# Patient Record
Sex: FEMALE | URBAN - METROPOLITAN AREA
[De-identification: names, ages, dates, MRNs, and addresses within clinical notes are randomized per-mention and may not be internally consistent; named-entity substitution may affect disease eponyms.]

---

## 2017-03-06 ENCOUNTER — RADIANT APPOINTMENT (OUTPATIENT)
Dept: GENERAL RADIOLOGY | Facility: CLINIC | Age: 67
End: 2017-03-06
Attending: NURSE PRACTITIONER
Payer: COMMERCIAL

## 2017-03-06 ENCOUNTER — OFFICE VISIT (OUTPATIENT)
Dept: FAMILY MEDICINE | Facility: CLINIC | Age: 67
End: 2017-03-06
Payer: COMMERCIAL

## 2017-03-06 VITALS
HEART RATE: 86 BPM | SYSTOLIC BLOOD PRESSURE: 124 MMHG | HEIGHT: 66 IN | DIASTOLIC BLOOD PRESSURE: 80 MMHG | RESPIRATION RATE: 16 BRPM | WEIGHT: 134 LBS | BODY MASS INDEX: 21.53 KG/M2 | TEMPERATURE: 98.4 F | OXYGEN SATURATION: 100 %

## 2017-03-06 DIAGNOSIS — M54.42 ACUTE LEFT-SIDED LOW BACK PAIN WITH LEFT-SIDED SCIATICA: ICD-10-CM

## 2017-03-06 DIAGNOSIS — Z13.6 CARDIOVASCULAR SCREENING; LDL GOAL LESS THAN 130: ICD-10-CM

## 2017-03-06 DIAGNOSIS — M54.2 CERVICALGIA: ICD-10-CM

## 2017-03-06 DIAGNOSIS — Z23 NEED FOR PROPHYLACTIC VACCINATION AGAINST STREPTOCOCCUS PNEUMONIAE (PNEUMOCOCCUS): ICD-10-CM

## 2017-03-06 DIAGNOSIS — M85.80 OSTEOPENIA: ICD-10-CM

## 2017-03-06 DIAGNOSIS — D50.0 IRON DEFICIENCY ANEMIA DUE TO CHRONIC BLOOD LOSS: ICD-10-CM

## 2017-03-06 DIAGNOSIS — Z71.89 ADVANCED DIRECTIVES, COUNSELING/DISCUSSION: ICD-10-CM

## 2017-03-06 DIAGNOSIS — J01.90 ACUTE SINUSITIS WITH SYMPTOMS > 10 DAYS: ICD-10-CM

## 2017-03-06 DIAGNOSIS — R53.83 FATIGUE, UNSPECIFIED TYPE: ICD-10-CM

## 2017-03-06 DIAGNOSIS — M54.2 CERVICALGIA: Primary | ICD-10-CM

## 2017-03-06 DIAGNOSIS — Z11.59 NEED FOR HEPATITIS C SCREENING TEST: ICD-10-CM

## 2017-03-06 PROCEDURE — G0009 ADMIN PNEUMOCOCCAL VACCINE: HCPCS | Performed by: NURSE PRACTITIONER

## 2017-03-06 PROCEDURE — 90670 PCV13 VACCINE IM: CPT | Performed by: NURSE PRACTITIONER

## 2017-03-06 PROCEDURE — 72040 X-RAY EXAM NECK SPINE 2-3 VW: CPT

## 2017-03-06 PROCEDURE — 72100 X-RAY EXAM L-S SPINE 2/3 VWS: CPT

## 2017-03-06 PROCEDURE — 99214 OFFICE O/P EST MOD 30 MIN: CPT | Mod: 25 | Performed by: NURSE PRACTITIONER

## 2017-03-06 RX ORDER — DOXYCYCLINE 100 MG/1
100 TABLET ORAL 2 TIMES DAILY
Qty: 14 TABLET | Refills: 0 | Status: SHIPPED | OUTPATIENT
Start: 2017-03-06 | End: 2017-03-13

## 2017-03-06 RX ORDER — CALCIUM CARBONATE 500(1250)
500 TABLET ORAL 2 TIMES DAILY
COMMUNITY

## 2017-03-06 RX ORDER — UREA 10 %
500 LOTION (ML) TOPICAL DAILY
COMMUNITY

## 2017-03-06 ASSESSMENT — PAIN SCALES - GENERAL: PAINLEVEL: MILD PAIN (2)

## 2017-03-06 NOTE — PROGRESS NOTES
Dear Jessica,    Your recent test results are attached.      Mild arthritic changes present.  Please continue with plan of chiropractic care.    If you have any questions please feel free to contact [phone] or myself via AdYappert.    Sincerely,  Celeste Jacobs, CNP

## 2017-03-06 NOTE — MR AVS SNAPSHOT
After Visit Summary   3/6/2017    Jessica Jordan    MRN: [id number]           Patient Information     Date Of Birth          1950        Visit Information        Provider Department      3/6/2017 10:20 AM Celeste Jacobs APRN [facility]        Today's Diagnoses     Cervicalgia    -  1    Acute left-sided low back pain with left-sided sciatica        Iron deficiency anemia due to chronic blood loss        Fatigue, unspecified type        Osteopenia        Need for prophylactic vaccination against Streptococcus pneumoniae (pneumococcus)        Need for hepatitis C screening test        CARDIOVASCULAR SCREENING; LDL GOAL LESS THAN 130        Advanced directives, counseling/discussion        Acute sinusitis with symptoms > 10 days          Care Instructions    [facility]    If you have any questions regarding to your visit please contact your care team:     Team Pink:   Clinic Hours Telephone Number   Internal Medicine:  Dr. Yodit Jacobs, NP       7am-7pm  Monday - Thursday   7am-5pm  Fridays  [phone]  (Appointment scheduling available 24/7)    Questions about your visit?  Team Line  [phone]   Urgent Care - [place] - 11am-9pm Monday-Friday Saturday-Sunday- 9am-5pm   [place] - 5pm-9pm Monday-Friday Saturday-Sunday- 9am-5pm  [phone] - [place]   [phone] - [place]       What options do I have for visits at the clinic other than the traditional office visit?  To expand how we care for you, many of our providers are utilizing electronic visits (e-visits) and telephone visits, when medically appropriate, for interactions with their patients rather than a visit in the clinic.   We also offer nurse visits for many medical concerns. Just like any other service, we will bill your insurance company for this type of visit based on time spent on the phone with your provider. Not  all insurance companies cover these visits. Please check with your medical insurance if this type of visit is covered. You will be responsible for any charges that are not paid by your insurance.      E-visits via WorkspaceharTeamPages:  generally incur a $35.00 fee.  Telephone visits:  Time spent on the phone: *charged based on time that is spent on the phone in increments of 10 minutes. Estimated cost:   5-10 mins $30.00   11-20 mins. $59.00   21-30 mins. $85.00   Use Acomni (secure email communication and access to your chart) to send your primary care provider a message or make an appointment. Ask someone on your Team how to sign up for Acomni.    For a Price Quote for your services, please call our BrandProject Line at [phone].    As always, Thank you for trusting us with your health care needs!    Discharged By:  GEOVANY PALOMINO          Follow-ups after your visit        Additional Services     HONORING [employer] REFERRAL       Your provider has referred you to Advance Directive Counseling with our [facility] Program.    Reason for Referral: Facilitate General Advance Care Planning    The [facility] Representative will call you to schedule your appointment, unless your appointment was already scheduled at your clinic.  You may also call the [employer] Representative at [phone] to schedule your appointment.            [facility] PT, HAND, AND CHIROPRACTIC REFERRAL       **This order will print in the [facility] Scheduling Office**    Physical Therapy, Hand Therapy and Chiropractic Care are available through:    *[facility]  *[facility]  *[facility]    Call one number to schedule at any of the above locations: [phone].    Your provider has referred you to: Chiropractic at [facility] or [facility]    Indication/Reason for Referral: neck and low back pain  Onset of Illness: 9 months  Therapy Orders: Evaluate and Treat  Special Programs: None  Special Request:  Norman George      Additional Comments for the Therapist or Chiropractor:     Please be aware that coverage of these services is subject to the terms and limitations of your health insurance plan.  Call member services at your health plan with any benefit or coverage questions.      Please bring the following to your appointment:    *Your personal calendar for scheduling future appointments  *Comfortable clothing                  Future tests that were ordered for you today     Open Future Orders        Priority Expected Expires Ordered    Hepatitis C Screen Reflex to HCV RNA Quant and Genotype Routine 3/6/2017 3/6/2018 3/6/2017    CBC with platelets Routine 3/6/2017 3/6/2018 3/6/2017    Ferritin Routine 3/6/2017 3/6/2018 3/6/2017    Iron and iron binding capacity Routine 3/6/2017 3/6/2018 3/6/2017    TSH with free T4 reflex Routine 3/6/2017 3/6/2018 3/6/2017    Comprehensive metabolic panel Routine 3/6/2017 3/6/2018 3/6/2017    Lipid panel reflex to direct LDL Routine 3/6/2017 3/6/2018 3/6/2017    XR Cervical Spine 2/3 Views Routine 3/6/2017 3/6/2018 3/6/2017    XR Lumbar Spine 2/3 Views Routine 3/6/2017 3/6/2018 3/6/2017            Who to contact     If you have questions or need follow up information about today's clinic visit or your schedule please contact [facility] directly at [phone].  Normal or non-critical lab and imaging results will be communicated to you by MyChart, letter or phone within 4 business days after the clinic has received the results. If you do not hear from us within 7 days, please contact the clinic through MetaMedhart or phone. If you have a critical or abnormal lab result, we will notify you by phone as soon as possible.  Submit refill requests through Vineloop or call your pharmacy and they will forward the refill request to us. Please allow 3 business days for your refill to be completed.          Additional Information About Your Visit        MyChart Information  "    [facility] gives you secure access to your electronic health record. If you see a primary care provider, you can also send messages to your care team and make appointments. If you have questions, please call your primary care clinic.  If you do not have a primary care provider, please call [phone] and they will assist you.        Care EveryWhere ID     This is your Care EveryWhere ID. This could be used by other organizations to access your [facility] medical records  [id number]        Your Vitals Were     Pulse Temperature Respirations Height Last Period Pulse Oximetry    86 98.4  F (36.9  C) (Oral) 16 5' 6\" (1.676 m) 10/27/1992 100%    Breastfeeding? BMI (Body Mass Index)                No 21.63 kg/m2           Blood Pressure from Last 3 Encounters:   03/06/17 124/80   10/27/15 119/73   12/10/14 123/83    Weight from Last 3 Encounters:   03/06/17 134 lb (60.8 kg)   10/27/15 144 lb 12.8 oz (65.7 kg)              We Performed the Following     HONORING CHOICES REFERRAL     [facility] PT, HAND, AND CHIROPRACTIC REFERRAL     PNEUMOCOCCAL CONJ VACCINE 13 VALENT IM (PREVNAR 13)          Today's Medication Changes          These changes are accurate as of: 3/6/17 11:15 AM.  If you have any questions, ask your nurse or doctor.               Start taking these medicines.        Dose/Directions    doxycycline Monohydrate 100 MG Tabs   Used for:  Acute sinusitis with symptoms > 10 days   Started by:  Celeste Jacobs APRN CNP        Dose:  100 mg   Take 100 mg by mouth 2 times daily for 7 days   Quantity:  14 tablet   Refills:  0         Stop taking these medicines if you haven't already. Please contact your care team if you have questions.     alendronate 70 MG tablet   Commonly known as:  FOSAMAX   Stopped by:  Celeste Jacobs APRN CNP           vitamin E 1000 UNIT capsule   Commonly known as:  TOCOPHEROL   Stopped by:  Celeste Jacobs APRN CNP                Where to get your medicines    "   These medications were sent to [facility] - [place], [place] - [address]  [address], [place] [address]     Phone:  [phone]     doxycycline Monohydrate 100 MG Tabs                Primary Care Provider Office Phone # Fax #    MAGALIS [place] [phone]       [facility] [address]  [place] [address]        Thank you!     Thank you for choosing [facility]  for your care. Our goal is always to provide you with excellent care. Hearing back from our patients is one way we can continue to improve our services. Please take a few minutes to complete the written survey that you may receive in the mail after your visit with us. Thank you!             Your Updated Medication List - Protect others around you: Learn how to safely use, store and throw away your medicines at www.disposemymeds.org.          This list is accurate as of: 3/6/17 11:15 AM.  Always use your most recent med list.                   Brand Name Dispense Instructions for use    Biotin 7500 MCG Tabs          calcium carbonate 500 MG tablet    OS-FRANKI 500 mg [place]. [place]     Take 500 mg by mouth 2 times daily       cyanocolbalamin 500 MCG tablet    vitamin  B-12     Take 500 mcg by mouth daily       doxycycline Monohydrate 100 MG Tabs     14 tablet    Take 100 mg by mouth 2 times daily for 7 days       MULTIVITAMIN ADULT PO      Take by mouth daily       omega 3 1000 MG Caps      Take 2 g by mouth       sodium-potassium bicarbonate Tbef solu-tab    ISAAK-SELTZER GOLD     Take 1 tablet by mouth once       VITAMIN B6 PO      Take by mouth daily       VITAMIN D3 PO      Take by mouth daily

## 2017-03-06 NOTE — PATIENT INSTRUCTIONS
[facility]    If you have any questions regarding to your visit please contact your care team:     Team Pink:   Clinic Hours Telephone Number   Internal Medicine:  Dr. Yodit Jacobs NP       7am-7pm  Monday - Thursday   7am-5pm  Fridays  [phone]  (Appointment scheduling available 24/7)    Questions about your visit?  Team Line  [phone]   Urgent Care - [place] and [facility] - 11am-9pm Monday-Friday Saturday-Sunday- 9am-5pm   [place] - 5pm-9pm Monday-Friday Saturday-Sunday- 9am-5pm  [phone] - [place]   [phone] - [place]       What options do I have for visits at the clinic other than the traditional office visit?  To expand how we care for you, many of our providers are utilizing electronic visits (e-visits) and telephone visits, when medically appropriate, for interactions with their patients rather than a visit in the clinic.   We also offer nurse visits for many medical concerns. Just like any other service, we will bill your insurance company for this type of visit based on time spent on the phone with your provider. Not all insurance companies cover these visits. Please check with your medical insurance if this type of visit is covered. You will be responsible for any charges that are not paid by your insurance.      E-visits via CureVac:  generally incur a $35.00 fee.  Telephone visits:  Time spent on the phone: *charged based on time that is spent on the phone in increments of 10 minutes. Estimated cost:   5-10 mins $30.00   11-20 mins. $59.00   21-30 mins. $85.00   Use [employer] Technologiest (secure email communication and access to your chart) to send your primary care provider a message or make an appointment. Ask someone on your Team how to sign up for CureVac.    For a Price Quote for your services, please call our Consumer Price Line at [phone].    As always, Thank you for trusting us with your health care  needs!    Discharged By:  GEOVANY PALOMINO

## 2017-03-06 NOTE — NURSING NOTE
"Chief Complaint   Patient presents with     Fatigue     Musculoskeletal Problem     left side       Initial /80  Pulse 86  Temp 98.4  F (36.9  C) (Oral)  Resp 16  Ht 5' 6\" (1.676 m)  Wt 134 lb (60.8 kg)  LMP 10/27/1992  SpO2 100%  Breastfeeding? No  BMI 21.63 kg/m2 Estimated body mass index is 21.63 kg/(m^2) as calculated from the following:    Height as of this encounter: 5' 6\" (1.676 m).    Weight as of this encounter: 134 lb (60.8 kg).  Medication Reconciliation: complete   Nuria Martinez CMA      "

## 2017-03-06 NOTE — PROGRESS NOTES
Dear Jessica,    Your recent test results are attached.      Mild arthritic changes present.  Please continue with plan for chiropractic care.    If you have any questions please feel free to contact [phone] or myself via Trumakert.    Sincerely,  Celeste Jacobs, CNP

## 2017-03-06 NOTE — PROGRESS NOTES
SUBJECTIVE:                                                    Jessica Jordan is a 67 year old female who presents to clinic today for the following health issues:    Musculoskeletal problem/pain left side body pain      Duration: 9 months    Description  Location: left side from shoulder to foot    Intensity:  moderate    Accompanying signs and symptoms: achy mostly from hip to knee    History  Previous similar problem: YES- for last 7 years  Previous evaluation:  none    Precipitating or alleviating factors:  Trauma or overuse: YES- work  Aggravating factors include: sitting and laying in bed    Therapies tried and outcome: heat and NSAID - aleve and advil       Fatigue for the past year. 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     Patient complains of intermittent pain to left neck and left shoulder.  Pain also occurs to left thigh, low back, and occasionally down left leg to left ankle.  Patient had a back injury in 1979, she is unsure what happened.  Patient takes advil pm, aleve with some improvement.  She has also tried chiropractic care.  Patient denies saddle anesthesia, fever, bowel/bladder dysfunction, leg weakness.    Patient complains of nasal congestion, chest congestion for the past 3 weeks.  Patient denies fever, productive cough, chest pain, shortness of breath.  Patient complains of sneezing, mild sore throat.  Patient has been doing nasal rinses, daphne selzer cold and sinus.    Problem list and histories reviewed & adjusted, as indicated.  Additional history: as documented    Patient Active Problem List   Diagnosis     Major depression in complete remission (H)     Vitamin D deficiency     Family history of ovarian cancer     Iron deficiency anemia due to chronic blood loss     Osteopenia     Past Surgical History   Procedure Laterality Date     Appendectomy       Breast surgery       Bilateral breast implants     Hysterectomy, pap no longer indicated         Social History  "  Substance Use Topics     Smoking status: Never Smoker     Smokeless tobacco: Not on file     Alcohol use Yes      Comment: once a week     History reviewed. No pertinent family history.      Current Outpatient Prescriptions   Medication Sig Dispense Refill     cyanocolbalamin (VITAMIN  B-12) 500 MCG tablet Take 500 mcg by mouth daily       Multiple Vitamins-Minerals (MULTIVITAMIN ADULT PO) Take by mouth daily       calcium carbonate (OS-FRANKI 500 MG [place]. [place]) 500 MG tablet Take 500 mg by mouth 2 times daily       Pyridoxine HCl (VITAMIN B6 PO) Take by mouth daily       doxycycline Monohydrate 100 MG TABS Take 100 mg by mouth 2 times daily for 7 days 14 tablet 0     Biotin 7500 MCG TABS        omega 3 1000 MG CAPS Take 2 g by mouth       Cholecalciferol (VITAMIN D3 PO) Take by mouth daily       sodium-potassium bicarbonate (ISAAK-SELTZER GOLD) TBEF Take 1 tablet by mouth once       Allergies   Allergen Reactions     Codeine      Penicillins      BP Readings from Last 3 Encounters:   03/06/17 124/80   10/27/15 119/73   12/10/14 123/83    Wt Readings from Last 3 Encounters:   03/06/17 134 lb (60.8 kg)   10/27/15 144 lb 12.8 oz (65.7 kg)                  Labs reviewed in EPIC    Reviewed and updated as needed this visit by clinical staff       Reviewed and updated as needed this visit by Provider         ROS:  Constitutional, HEENT, cardiovascular, pulmonary, gi and gu systems are negative, except as otherwise noted.    OBJECTIVE:                                                    /80  Pulse 86  Temp 98.4  F (36.9  C) (Oral)  Resp 16  Ht 5' 6\" (1.676 m)  Wt 134 lb (60.8 kg)  LMP 10/27/1992  SpO2 100%  Breastfeeding? No  BMI 21.63 kg/m2  Body mass index is 21.63 kg/(m^2).  GENERAL: healthy, alert and no distress  EYES: Eyes grossly normal to inspection, PERRL and conjunctivae and sclerae normal  HENT: normal cephalic/atraumatic, ear canals and TM's normal, nose and mouth without ulcers or lesions, nasal mucosa " edematous , oropharynx clear and oral mucous membranes moist  NECK: no adenopathy, no asymmetry, masses, or scars and thyroid normal to palpation  RESP: lungs clear to auscultation - no rales, rhonchi or wheezes  CV: regular rate and rhythm, normal S1 S2, no S3 or S4, no murmur, click or rub, no peripheral edema and peripheral pulses strong  MS: Neck: Tenderness to left trapezius; full range of motion without pain; 5/5 strength and sensation intact to bilateral upper extremities; Spurling's test negative  Comprehensive back pain exam:  Tenderness of left sciatic notch, Range of motion not limited by pain, Lower extremity strength functional and equal on both sides, Lower extremity reflexes within normal limits bilaterally, Lower extremity sensation normal and equal on both sides and Straight leg raise negative bilaterally    Diagnostic Test Results:  pending     ASSESSMENT/PLAN:                                                      1. Cervicalgia    - XR Cervical Spine 2/3 Views; Future  - [facility] PT, HAND, AND CHIROPRACTIC REFERRAL    2. Acute left-sided low back pain with left-sided sciatica    - XR Lumbar Spine 2/3 Views; Future  - [facility] PT, HAND, AND CHIROPRACTIC REFERRAL    3. Iron deficiency anemia due to chronic blood loss    - CBC with platelets; Future  - Ferritin; Future  - Iron and iron binding capacity; Future    4. Fatigue, unspecified type    - CBC with platelets; Future  - TSH with free T4 reflex; Future  - Comprehensive metabolic panel; Future    5. Osteopenia  Patient declines bisphosphonate's at this time and prefers to work on exercise and recheck DEXA scan again in 2 years.    6. Need for prophylactic vaccination against Streptococcus pneumoniae (pneumococcus)    - PNEUMOCOCCAL CONJ VACCINE 13 VALENT IM (PREVNAR 13)    7. Need for hepatitis C screening test    - Hepatitis C Screen Reflex to HCV RNA Quant and Genotype; Future    8. CARDIOVASCULAR SCREENING; LDL GOAL LESS THAN 130    - Lipid panel reflex  to direct LDL; Future    9. Advanced directives, counseling/discussion    - HONORING CHOICES REFERRAL    10. Acute sinusitis with symptoms > 10 days    - doxycycline Monohydrate 100 MG TABS; Take 100 mg by mouth 2 times daily for 7 days  Dispense: 14 tablet; Refill: 0    FUTURE APPOINTMENTS:       - Follow-up for annual visit or as needed    MAGALIS Mcneil CNP  [facility]'

## 2017-03-08 DIAGNOSIS — Z11.59 NEED FOR HEPATITIS C SCREENING TEST: ICD-10-CM

## 2017-03-08 DIAGNOSIS — Z13.6 CARDIOVASCULAR SCREENING; LDL GOAL LESS THAN 130: ICD-10-CM

## 2017-03-08 DIAGNOSIS — D50.0 IRON DEFICIENCY ANEMIA DUE TO CHRONIC BLOOD LOSS: ICD-10-CM

## 2017-03-08 DIAGNOSIS — R53.83 FATIGUE, UNSPECIFIED TYPE: ICD-10-CM

## 2017-03-08 LAB
ALBUMIN SERPL-MCNC: 3.8 G/DL (ref 3.4–5)
ALP SERPL-CCNC: 80 U/L (ref 40–150)
ALT SERPL W P-5'-P-CCNC: 23 U/L (ref 0–50)
ANION GAP SERPL CALCULATED.3IONS-SCNC: 4 MMOL/L (ref 3–14)
AST SERPL W P-5'-P-CCNC: 19 U/L (ref 0–45)
BILIRUB SERPL-MCNC: 0.5 MG/DL (ref 0.2–1.3)
BUN SERPL-MCNC: 16 MG/DL (ref 7–30)
CALCIUM SERPL-MCNC: 9.4 MG/DL (ref 8.5–10.1)
CHLORIDE SERPL-SCNC: 109 MMOL/L (ref 94–109)
CHOLEST SERPL-MCNC: 229 MG/DL
CO2 SERPL-SCNC: 29 MMOL/L (ref 20–32)
CREAT SERPL-MCNC: 0.81 MG/DL (ref 0.52–1.04)
ERYTHROCYTE [DISTWIDTH] IN BLOOD BY AUTOMATED COUNT: 13.1 % (ref 10–15)
FERRITIN SERPL-MCNC: 42 NG/ML (ref 8–252)
GFR SERPL CREATININE-BSD FRML MDRD: 71 ML/MIN/1.7M2
GLUCOSE SERPL-MCNC: 103 MG/DL (ref 70–99)
HCT VFR BLD AUTO: 40.4 % (ref 35–47)
HCV AB SERPL QL IA: NORMAL
HDLC SERPL-MCNC: 107 MG/DL
HGB BLD-MCNC: 13.3 G/DL (ref 11.7–15.7)
IRON SATN MFR SERPL: 38 % (ref 15–46)
IRON SERPL-MCNC: 135 UG/DL (ref 35–180)
LDLC SERPL CALC-MCNC: 109 MG/DL
MCH RBC QN AUTO: 31.4 PG (ref 26.5–33)
MCHC RBC AUTO-ENTMCNC: 32.9 G/DL (ref 31.5–36.5)
MCV RBC AUTO: 95 FL (ref 78–100)
NONHDLC SERPL-MCNC: 122 MG/DL
PLATELET # BLD AUTO: 284 10E9/L (ref 150–450)
POTASSIUM SERPL-SCNC: 3.9 MMOL/L (ref 3.4–5.3)
PROT SERPL-MCNC: 7 G/DL (ref 6.8–8.8)
RBC # BLD AUTO: 4.24 10E12/L (ref 3.8–5.2)
SODIUM SERPL-SCNC: 142 MMOL/L (ref 133–144)
TIBC SERPL-MCNC: 355 UG/DL (ref 240–430)
TRIGL SERPL-MCNC: 67 MG/DL
TSH SERPL DL<=0.005 MIU/L-ACNC: 3.06 MU/L (ref 0.4–4)
WBC # BLD AUTO: 4.9 10E9/L (ref 4–11)

## 2017-03-08 PROCEDURE — 36415 COLL VENOUS BLD VENIPUNCTURE: CPT | Performed by: NURSE PRACTITIONER

## 2017-03-08 PROCEDURE — 82728 ASSAY OF FERRITIN: CPT | Performed by: NURSE PRACTITIONER

## 2017-03-08 PROCEDURE — 84443 ASSAY THYROID STIM HORMONE: CPT | Performed by: NURSE PRACTITIONER

## 2017-03-08 PROCEDURE — 83540 ASSAY OF IRON: CPT | Performed by: NURSE PRACTITIONER

## 2017-03-08 PROCEDURE — 80061 LIPID PANEL: CPT | Performed by: NURSE PRACTITIONER

## 2017-03-08 PROCEDURE — 80053 COMPREHEN METABOLIC PANEL: CPT | Performed by: NURSE PRACTITIONER

## 2017-03-08 PROCEDURE — 86803 HEPATITIS C AB TEST: CPT | Performed by: NURSE PRACTITIONER

## 2017-03-08 PROCEDURE — 83550 IRON BINDING TEST: CPT | Performed by: NURSE PRACTITIONER

## 2017-03-08 PROCEDURE — 85027 COMPLETE CBC AUTOMATED: CPT | Performed by: NURSE PRACTITIONER

## 2017-03-08 NOTE — PROGRESS NOTES
"Dear Jessica,    Your recent test results are attached.      Normal iron and iron stores.  Normal thyroid.    Your fasting glucose is elevated and puts you in the range of what's considered impaired fasting glucose or \"pre-diabetes\".  This increases your risk of developing Type 2 Diabetes in the future.  Normal glucose is considered less than 100 mg/dL and impaired fasting glucose ranges from 100-125 mg/dL.  Diet and exercise can help improve this number.  I would recommend trying to eat a well balanced diet with fruits, vegetables, whole grains, low fat dairy, and lean protein.  I would also recommend try to increase your physical activity to the point that you are exercising 30-60 minutes per day 5 or more days per week. If you are interested in taking a pre-diabetes class, please contact clinic at [phone].    Good cholesterol.          If you have any questions please feel free to contact [phone] or myself via The NewsMarkett.    Sincerely,  Celeste Jacobs, CNP  "

## 2017-03-08 NOTE — PROGRESS NOTES
Dear Jessica,    Your recent test results are attached.      No anemia.    If you have any questions please feel free to contact [phone] or myself via iovoxt.    Sincerely,  Celeste Jacobs, CNP

## 2017-03-08 NOTE — PROGRESS NOTES
Dear Jessica,    Your recent test results are attached.      No hepatitis C on screening.    If you have any questions please feel free to contact [phone] or myself via [employer] Techn[employer]iest.    Sincerely,  Celeste Jacobs, CNP

## 2017-03-15 ENCOUNTER — THERAPY VISIT (OUTPATIENT)
Dept: CHIROPRACTIC MEDICINE | Facility: CLINIC | Age: 67
End: 2017-03-15
Payer: COMMERCIAL

## 2017-03-15 DIAGNOSIS — M99.02 THORACIC SEGMENT DYSFUNCTION: ICD-10-CM

## 2017-03-15 DIAGNOSIS — M99.01 CERVICAL SEGMENT DYSFUNCTION: ICD-10-CM

## 2017-03-15 DIAGNOSIS — M54.2 CERVICALGIA: ICD-10-CM

## 2017-03-15 DIAGNOSIS — M99.05 SEGMENTAL DYSFUNCTION OF PELVIC REGION: Primary | ICD-10-CM

## 2017-03-15 DIAGNOSIS — M54.50 LUMBAGO: ICD-10-CM

## 2017-03-15 DIAGNOSIS — M99.03 SEGMENTAL DYSFUNCTION OF LUMBAR REGION: ICD-10-CM

## 2017-03-15 DIAGNOSIS — M62.838 SPASM OF MUSCLE: ICD-10-CM

## 2017-03-15 PROCEDURE — 98941 CHIROPRACT MANJ 3-4 REGIONS: CPT | Mod: AT | Performed by: CHIROPRACTOR

## 2017-03-15 PROCEDURE — 99203 OFFICE O/P NEW LOW 30 MIN: CPT | Mod: GA | Performed by: CHIROPRACTOR

## 2017-03-15 NOTE — PROGRESS NOTES
Chiropractic Clinic Visit    PCP: Celeste Jacobs    Jessica Jordan is a 67 year old female who is seen  in consultation at the request of  Celeste Jacobs C.N.P. presenting with left side pain from ankle to neck, with primary pain from lateral thigh to lumbar. Patient reports that the onset was 5 years ago with an increase in pain in the last 6 months and another increase in the last month. When asked, patient denies:, falling, slipping, bending and reaching or sleeping awkwardly. Jessica reports initially her left leg would give out when bowling which she discontinued, now provacative factors include sleeping and sitting with legs extended in a recliner.  Prior to onset, the patient was able to sleep through the night. Patient notes that due to symptoms, they can only wake several times a night and readjust to fall asleep. Jessica Jordan notes   sleeping rated at a 9/10 difficulty and prior to this incident it was 0/10.        Injury: none    Location of Pain: left ankle to neck at the following level(s) C4 , C5 , C6 , T12 , L1, L4  and L5   Duration of Pain: 5 year(s)  Rating of Pain at worst: 9/10  Rating of Pain Currently: 4/10  Symptoms are better with: Aleve, Ibuprofen and Rest  Symptoms are worse with: sitting and sleeping  Additional Features:      Other evaluation and/or treatments so far consists of: Aleve and Ibuprofen    Health History  as reported by the patient:    How does the patient rate their own health:   Good    Current or past medical history:   Depression and Pain at night/rest    Medical allergies  Other: Penicillin    Past Traumas/Surgeries  None    Family History  The family history is not on file.    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    Past Medical History   Diagnosis Date     Depressive disorder      Past Surgical History   Procedure Laterality Date     Appendectomy       Breast surgery       Bilateral breast implants     Hysterectomy, pap no longer indicated         Objective  LMP 10/27/1992    GENERAL APPEARANCE: healthy, alert and no distress   GAIT: NORMAL  SKIN: no suspicious lesions or rashes  NEURO: Normal strength and tone, mentation intact and speech normal  PSYCH:  mentation appears normal and affect normal/bright      Jessica was asked to complete the Neck Disability Index, the Oswestry Low Back Disability Index and Doris Start Back screening tool, today in the office. Patient declined to complete the NDI form., The Oswestry Disability score: 17.14%. Keel Start Total Score:3 Sub Score: 1       Cervical Spine Exam    Range of Motion:         forward flexion mildly limited no pain         extension mildly limited no pain         lateral rotation moderately limited no pain        lateral flexion moderately limited no pain    Inspection:         No visible deformity        normal lordotic curvature maintained    Tender:        upper border of trapezius    Non-Tender:        remainder of cervical spine area    Strength:       C4 (shoulder shrug)  symmetric 5/5       C5 (shoulder abduction) symmetric 5/5       C6 (elbow flexion) symmetric 5/5       C7 (elbow extension) symmetric 5/5       C8 (finger abduction, thumb flexion) symmetric 5/5    Reflexes:        C5 (biceps) symmetric normal    Sensation:       grossly intact througout bilateral upper extremities    Special Tests:       neg (-) Spurling  Rik's- negative and Shoulder depression - Right negative and Left negative    Lymphatics:        no edema noted in the upper extremities       Lumbar exam:    Inspection:  \"     no visible deformity in the low back       normal skin\",    ROM:    "    limited flexion due to pain       limited extension due to pain       asymmetric rotation of the pelvis with flexion    Tender:       paraspinal muscles    Non Tender:       remainder of lumbar spine    Strength:       hip flexion 5/5       knee extension 5/5       ankle dorsiflexion 5/5       ankle plantarflexion 5/5       dorsiflexion of the great toe 5/5    Reflexes:       patellar (L3, L4) symmetric normal    Sensation:      grossly intact throughout lower extremities    Special tests:  SLR - Right negative and Left negative, Fabere - Right negative and Left negative, Yeoman's - Right negative and Left negative, Tyrell - Right negative and Left negative and Ely's - Right negative and Left negative    Segmental spinal dysfunction/restrictions found at:  :  C1 Left rotation restricted  C6 Right rotation restricted  C7 Right rotation restricted  T1 Right rotation restricted  T2 Right rotation restricted  T12 Extension restriction  L1 Left rotation restricted  L2 Left rotation restricted  L5 Right rotation restricted  PSIS Right Extension restriction.    The following soft tissue hypotonicities were observed:Piriformis: left, referred pain: no  Traps: ache, dull pain and stiff, referred pain: no    Trigger points were found in:Piriformis and Traps    Muscle spasm found in:Piriformis and Traps      Radiology:  XR cervical 3.6.37  IMPRESSION: Mild low cervical degenerative change, principally  characterized by loss of disc space.     Lumbar XR 3.6.17  IMPRESSION: Mild degenerative change.    Assessment:    1. Segmental dysfunction of pelvic region    2. Lumbago    3. Segmental dysfunction of lumbar region    4. Spasm of muscle    5. Thoracic segment dysfunction    6. Cervicalgia    7. Cervical segment dysfunction        RX ordered/plan of care  Anticipated outcomes  Possible risks and side effects    After discussing the risk and benefits of care, patient consented to treatment    Prognosis: Good      Patient's  condition:  Patient had restrictions pre-manipulation    Treatment effectiveness:  Post manipulation there is better intersegmental movement and Patient claims to feel looser post manipulation      Plan:    Procedures:  Evaluation and Management  93405 Moderate level exam 30 min    CMT:  32210 Chiropractic manipulative treatment 3-4 regions performed   Cervical: Activator, C1 , C6, C7 , Prone  Thoracic: Activator, T1, T2, T11, T12, Prone  Lumbar: Activator, L1, L2, L5, Prone  Pelvis: Drop Table, PSIS Right , Prone    Modalities:  01122: Heat:   For 5 min to Piriformis and Traps  34192: MSTM:  To Piriformis and Traps  for 5 min    Therapeutic procedures:  None    Response to Treatment  Reduction in symptoms as reported by patient    Treatment plan and goals:  Goals:  YVETTE: To change YVETTE score from 17 to 0    Frequency of care  Duration of care is estimated to be 6 weeks, from the initial treatment.  It is estimated that the patient will need a total of 6 visits to resolve this episode.  For the initial therapeutic trial of care, the frequency is recommended at 1 X week, once daily.  A reevaluation would be clinically appropriate in 6 visits, to determine progress and further course of care.    In-Office Treatment  Evaluation  Spinal Chiropractic Manipulative Therapy:    Modalities:  Heat and MSTM              Recommendations:    Instructions:ice 20 minutes every other hour as needed    Follow-up:  Return to care in one week.       Disclaimer: This note consists of symbols derived from keyboarding, dictation and/or voice recognition software. As a result, there may be errors in the script that have gone undetected. Please consider this when interpreting information found in this chart.

## 2017-03-22 ENCOUNTER — THERAPY VISIT (OUTPATIENT)
Dept: CHIROPRACTIC MEDICINE | Facility: CLINIC | Age: 67
End: 2017-03-22
Payer: COMMERCIAL

## 2017-03-22 DIAGNOSIS — M99.05 SEGMENTAL DYSFUNCTION OF PELVIC REGION: Primary | ICD-10-CM

## 2017-03-22 DIAGNOSIS — M99.01 CERVICAL SEGMENT DYSFUNCTION: ICD-10-CM

## 2017-03-22 DIAGNOSIS — M62.838 SPASM OF MUSCLE: ICD-10-CM

## 2017-03-22 DIAGNOSIS — M54.50 LUMBAGO: ICD-10-CM

## 2017-03-22 DIAGNOSIS — M54.2 CERVICALGIA: ICD-10-CM

## 2017-03-22 DIAGNOSIS — M99.03 SEGMENTAL DYSFUNCTION OF LUMBAR REGION: ICD-10-CM

## 2017-03-22 DIAGNOSIS — M99.02 SEGMENTAL DYSFUNCTION OF THORACIC REGION: ICD-10-CM

## 2017-03-22 PROCEDURE — 98941 CHIROPRACT MANJ 3-4 REGIONS: CPT | Mod: AT | Performed by: CHIROPRACTOR

## 2017-03-22 NOTE — PROGRESS NOTES
Visit #:  2 of 6, based on treatment plan    Subjective:  Jessica Jordan is a 67 year old female who is seen in f/u up for:        Segmental dysfunction of pelvic region  Lumbago  Segmental dysfunction of lumbar region  Spasm of muscle  Segmental dysfunction of thoracic region  Cervicalgia  Cervical segment dysfunction.     Since last visit on 3/15/2017,  Jessica Jordan reports the following changes: Pain immediately after last treatment: 2/10 and their pain level today 6/10.  Jessica reports that her low back and hips felt good until last night when she had a sharp stab in her right hip when brushing her teeth.  She notes a significant decrease in pain in her left hip.  Jessica reports her neck felt better for about a day when the pain returned to previous levels.    Area of chief complaint:  Cervical and Lumbar :  Symptoms are graded at 6/10. The quality is described as achey.  Motion has increased, but is still not normal. Patient feels that they are improved due to a reduction in symptoms.        Objective:  The following was observed:    P: palpatory tenderness, R piriformis and upper traps    A: static palpation demonstrates intersegmental asymmetry , pelvis, lumbar, thoracic and cervical spine     R: motion palpation notes restricted motion, :  C1 Left rotation restricted  C6 Right rotation restricted  C7 Right rotation restricted  T1 Right rotation restricted  T2 Right rotation restricted  T6 Extension restriction  T7 Extension restriction  T8 Extension restriction  L4 Right rotation restricted  L5 Right rotation restricted  PSIS Right Extension restriction    T: hypertonicity at: Piriformis and Traps R>>L      Assessment:    Segmental spinal dysfunction/restrictions found at:  C1   C6   C7   T1   T2   T6  T7  T8  L4  L5  PSIS Right    Diagnoses:      1. Segmental dysfunction of pelvic region    2. Lumbago    3. Segmental dysfunction of lumbar region    4. Spasm of muscle    5. Segmental dysfunction of  thoracic region    6. Cervicalgia    7. Cervical segment dysfunction        Patient's condition:  Patient had restrictions pre-manipulation    Treatment effectiveness:  Post manipulation there is better intersegmental movement and Patient claims to feel looser post manipulation      Procedures:  CMT:  85922 Chiropractic manipulative treatment 3-4 regions performed   Cervical: Activator, C1 , C6, C7 , Prone  Thoracic: Activator, T1, T2, T6, T7, T8, Prone  Lumbar: Activator, L4, L5, Prone  Pelvis: Drop Table, PSIS Right , Prone    Modalities:  None performed this visit    Therapeutic procedures:  Stretches - demonstrated and practiced seated crossed leg piriformis stretch      Prognosis: Good    Progress towards Goals: Patient is making progress towards the goal     Response to Treatment:   Reduction in symptoms as reported by patient      Recommendations:    Instructions:ice 20 minutes every other hour as needed and stretch as instructed at visit    Follow-up:  Return to care in one week

## 2017-03-30 ENCOUNTER — THERAPY VISIT (OUTPATIENT)
Dept: CHIROPRACTIC MEDICINE | Facility: CLINIC | Age: 67
End: 2017-03-30
Payer: COMMERCIAL

## 2017-03-30 DIAGNOSIS — M99.03 SEGMENTAL DYSFUNCTION OF LUMBAR REGION: ICD-10-CM

## 2017-03-30 DIAGNOSIS — M62.838 SPASM OF MUSCLE: ICD-10-CM

## 2017-03-30 DIAGNOSIS — M99.05 SEGMENTAL DYSFUNCTION OF PELVIC REGION: Primary | ICD-10-CM

## 2017-03-30 DIAGNOSIS — M99.02 THORACIC SEGMENT DYSFUNCTION: ICD-10-CM

## 2017-03-30 DIAGNOSIS — M54.2 CERVICALGIA: ICD-10-CM

## 2017-03-30 DIAGNOSIS — M99.01 CERVICAL SEGMENT DYSFUNCTION: ICD-10-CM

## 2017-03-30 DIAGNOSIS — M54.50 LUMBAGO: ICD-10-CM

## 2017-03-30 PROCEDURE — 98941 CHIROPRACT MANJ 3-4 REGIONS: CPT | Mod: AT | Performed by: CHIROPRACTOR

## 2017-03-30 NOTE — PROGRESS NOTES
Visit #:  3 of 6, based on treatment plan    Subjective:  Jessica Jordan is a 67 year old female who is seen in f/u up for:        Segmental dysfunction of pelvic region  Lumbago  Segmental dysfunction of lumbar region  Spasm of muscle  Thoracic segment dysfunction  Cervicalgia  Cervical segment dysfunction.     Since last visit on 3/22/2017,  Jessica Jordan reports the following changes: Pain immediately after last treatment: 2/10 and their pain level today 5/10.  Jessica reports that her low back and hips are feeling better still a bit stiff at times.  Her neck is very tight and sore this past week    Area of chief complaint:  Cervical and Lumbar :  Symptoms are graded at 6/10. The quality is described as achey.  Motion has increased, but is still not normal. Patient feels that they are improved due to a reduction in symptoms.        Objective:  The following was observed:    P: palpatory tenderness, R piriformis and upper traps    A: static palpation demonstrates intersegmental asymmetry , pelvis, lumbar, thoracic and cervical spine     R: motion palpation notes restricted motion, :  C1 Left rotation restricted  C6 Right rotation restricted  C7 Right rotation restricted  T1 Right rotation restricted  T2 Right rotation restricted  T6 Extension restriction  T7 Extension restriction  T8 Extension restriction  L4 Right rotation restricted  L5 Right rotation restricted  PSIS Right Extension restriction    T: hypertonicity at: Piriformis and Traps R>>L      Assessment:    Segmental spinal dysfunction/restrictions found at:  C1   C6   C7   T1   T2   T6  T7  T8  L4  L5  PSIS Right    Diagnoses:      1. Segmental dysfunction of pelvic region    2. Lumbago    3. Segmental dysfunction of lumbar region    4. Spasm of muscle    5. Thoracic segment dysfunction    6. Cervicalgia    7. Cervical segment dysfunction        Patient's condition:  Patient had restrictions pre-manipulation    Treatment effectiveness:  Post  manipulation there is better intersegmental movement and Patient claims to feel looser post manipulation      Procedures:  CMT:  32320 Chiropractic manipulative treatment 3-4 regions performed   Cervical: Activator, C1 , C6, C7 , Prone  Thoracic: Activator, T1, T2, T6, T7, T8, Prone  Lumbar: Activator, L4, L5, Prone  Pelvis: Drop Table, PSIS Right , Prone    Modalities:  None performed this visit    Therapeutic procedures:  Stretches - demonstrated and practiced seated  Scapular retraction    Prognosis: Good    Progress towards Goals: Patient is making progress towards the goal     Response to Treatment:   Reduction in symptoms as reported by patient      Recommendations:    Instructions:ice 20 minutes every other hour as needed and stretch as instructed at visit    Follow-up:  Return to care in one week

## 2017-04-06 ENCOUNTER — THERAPY VISIT (OUTPATIENT)
Dept: CHIROPRACTIC MEDICINE | Facility: CLINIC | Age: 67
End: 2017-04-06
Payer: COMMERCIAL

## 2017-04-06 DIAGNOSIS — M62.838 SPASM OF MUSCLE: ICD-10-CM

## 2017-04-06 DIAGNOSIS — M99.02 SEGMENTAL DYSFUNCTION OF THORACIC REGION: ICD-10-CM

## 2017-04-06 DIAGNOSIS — M54.50 LUMBAGO: ICD-10-CM

## 2017-04-06 DIAGNOSIS — M99.01 CERVICAL SEGMENT DYSFUNCTION: ICD-10-CM

## 2017-04-06 DIAGNOSIS — M54.2 CERVICALGIA: ICD-10-CM

## 2017-04-06 DIAGNOSIS — M99.05 SEGMENTAL DYSFUNCTION OF PELVIC REGION: Primary | ICD-10-CM

## 2017-04-06 DIAGNOSIS — M99.03 SEGMENTAL DYSFUNCTION OF LUMBAR REGION: ICD-10-CM

## 2017-04-06 PROCEDURE — 98941 CHIROPRACT MANJ 3-4 REGIONS: CPT | Mod: AT | Performed by: CHIROPRACTOR

## 2017-04-06 NOTE — PROGRESS NOTES
Visit #:  4 of 6, based on treatment plan    Subjective:  Jessica Jordan is a 67 year old female who is seen in f/u up for:        Segmental dysfunction of pelvic region  Lumbago  Segmental dysfunction of lumbar region  Spasm of muscle  Thoracic segment dysfunction  Cervicalgia  Cervical segment dysfunction.     Since last visit on 3/30/2017,  Jessica Jordan reports the following changes: Pain immediately after last treatment: 2/10 and their pain level today 4/10.  Jessica reports that her low back and hips are feeling better still a bit stiff at times.  Her neck is  Feeling less tense overall.  She went for a 2 mile walk this past week and is a little stiff from that.  This was the first time she has walked in a while.    Area of chief complaint:  Cervical and Lumbar :  Symptoms are graded at 4/10. The quality is described as achey.  Motion has increased, but is still not normal. Patient feels that they are improved due to a reduction in symptoms.        Objective:  The following was observed:    P: palpatory tenderness, R piriformis and upper traps    A: static palpation demonstrates intersegmental asymmetry , pelvis, lumbar, thoracic and cervical spine     R: motion palpation notes restricted motion, :  C6 Right rotation restricted  C7 Right rotation restricted  T1 Right rotation restricted  T2 Right rotation restricted  T6 Extension restriction  T7 Extension restriction  T8 Extension restriction  L4 Right rotation restricted  L5 Right rotation restricted  PSIS Right Extension restriction    T: hypertonicity at: Piriformis and Traps R>>L      Assessment:    Segmental spinal dysfunction/restrictions found at:  C6   C7   T1   T2   T6  T7  T8  L4  L5  PSIS Right    Diagnoses:      1. Segmental dysfunction of pelvic region    2. Lumbago    3. Segmental dysfunction of lumbar region    4. Spasm of muscle    5. Thoracic segment dysfunction    6. Cervicalgia    7. Cervical segment dysfunction        Patient's  condition:  Patient had restrictions pre-manipulation    Treatment effectiveness:  Post manipulation there is better intersegmental movement and Patient claims to feel looser post manipulation      Procedures:  CMT:  16425 Chiropractic manipulative treatment 3-4 regions performed   Cervical: Activator, C6, C7 , Prone  Thoracic: Activator, T1, T2, T6, T7, T8, Prone  Lumbar: Activator, L4, L5, Prone  Pelvis: Drop Table, PSIS Right , Prone    Modalities:  None performed this visit    Therapeutic procedures:  Stretches - demonstrated and practiced seated  Scapular retraction    Prognosis: Good    Progress towards Goals: Patient is making progress towards the goal     Response to Treatment:   Reduction in symptoms as reported by patient      Recommendations:    Instructions:ice 20 minutes every other hour as needed and stretch as instructed at visit    Follow-up:  Return to care in one week

## 2017-04-13 ENCOUNTER — THERAPY VISIT (OUTPATIENT)
Dept: CHIROPRACTIC MEDICINE | Facility: CLINIC | Age: 67
End: 2017-04-13
Payer: COMMERCIAL

## 2017-04-13 DIAGNOSIS — M54.2 CERVICALGIA: ICD-10-CM

## 2017-04-13 DIAGNOSIS — M54.50 LUMBAGO: ICD-10-CM

## 2017-04-13 DIAGNOSIS — M62.838 SPASM OF MUSCLE: ICD-10-CM

## 2017-04-13 DIAGNOSIS — M99.05 SEGMENTAL DYSFUNCTION OF PELVIC REGION: Primary | ICD-10-CM

## 2017-04-13 DIAGNOSIS — M99.02 SEGMENTAL DYSFUNCTION OF THORACIC REGION: ICD-10-CM

## 2017-04-13 DIAGNOSIS — M99.03 SEGMENTAL DYSFUNCTION OF LUMBAR REGION: ICD-10-CM

## 2017-04-13 DIAGNOSIS — M99.01 CERVICAL SEGMENT DYSFUNCTION: ICD-10-CM

## 2017-04-13 PROCEDURE — 98941 CHIROPRACT MANJ 3-4 REGIONS: CPT | Mod: AT | Performed by: CHIROPRACTOR

## 2017-04-13 NOTE — PROGRESS NOTES
Visit #:  5 of 6, based on treatment plan    Subjective:  Jessica Jordan is a 67 year old female who is seen in f/u up for:        Segmental dysfunction of pelvic region  Lumbago  Segmental dysfunction of lumbar region  Spasm of muscle  Thoracic segment dysfunction  Cervicalgia  Cervical segment dysfunction.     Since last visit on 4/6/2017,  Jessica Jordan reports the following changes: Pain immediately after last treatment: 2/10 and their pain level today 0/10.  Jessica reports that her low back and hips are feeling better.  She is not having any pain unless she is stretching and the she feels some pain on the right where it is tighter. Overall she is feeling improved.    Area of chief complaint:  Cervical and Lumbar :  Symptoms are graded at 0/10. The quality is described as achey.  Motion has increased, but is still not normal. Patient feels that they are improved due to a reduction in symptoms.        Objective:  The following was observed:    P: palpatory tenderness, R piriformis and upper traps    A: static palpation demonstrates intersegmental asymmetry , pelvis, lumbar, thoracic and cervical spine     R: motion palpation notes restricted motion, :  C6 Right rotation restricted  C7 Right rotation restricted  T1 Right rotation restricted  T2 Right rotation restricted  T6 Extension restriction  T7 Extension restriction  T8 Extension restriction  L4 Right rotation restricted  L5 Right rotation restricted  PSIS Right Extension restriction    T: hypertonicity at: Piriformis and Traps R>>L      Assessment:    Segmental spinal dysfunction/restrictions found at:  C6   C7   T1   T2   T6  T7  T8  L4  L5  PSIS Right    Diagnoses:      1. Segmental dysfunction of pelvic region    2. Lumbago    3. Segmental dysfunction of lumbar region    4. Spasm of muscle    5. Thoracic segment dysfunction    6. Cervicalgia    7. Cervical segment dysfunction        Patient's condition:  Patient had restrictions  pre-manipulation    Treatment effectiveness:  Post manipulation there is better intersegmental movement and Patient claims to feel looser post manipulation      Procedures:  CMT:  71096 Chiropractic manipulative treatment 3-4 regions performed   Cervical: Activator, C6, C7 , Prone  Thoracic: Activator, T1, T2, T6, T7, T8, Prone  Lumbar: Activator, L4, L5, Prone  Pelvis: Drop Table, PSIS Right , Prone    Modalities:  None performed this visit    Therapeutic procedures:  None    Prognosis: Good    Progress towards Goals: Patient is making progress towards the goal     Response to Treatment:   Reduction in symptoms as reported by patient      Recommendations:    Instructions:ice 20 minutes every other hour as needed and stretch as instructed at visit    Follow-up:  Return to care in one two weeks

## 2017-04-27 ENCOUNTER — THERAPY VISIT (OUTPATIENT)
Dept: CHIROPRACTIC MEDICINE | Facility: CLINIC | Age: 67
End: 2017-04-27
Payer: COMMERCIAL

## 2017-04-27 DIAGNOSIS — M99.02 THORACIC SEGMENT DYSFUNCTION: ICD-10-CM

## 2017-04-27 DIAGNOSIS — M54.50 LUMBAGO: ICD-10-CM

## 2017-04-27 DIAGNOSIS — M54.2 CERVICALGIA: ICD-10-CM

## 2017-04-27 DIAGNOSIS — M99.05 SEGMENTAL DYSFUNCTION OF PELVIC REGION: Primary | ICD-10-CM

## 2017-04-27 DIAGNOSIS — M99.03 SEGMENTAL DYSFUNCTION OF LUMBAR REGION: ICD-10-CM

## 2017-04-27 DIAGNOSIS — M62.838 SPASM OF MUSCLE: ICD-10-CM

## 2017-04-27 DIAGNOSIS — M99.01 CERVICAL SEGMENT DYSFUNCTION: ICD-10-CM

## 2017-04-27 PROCEDURE — 98941 CHIROPRACT MANJ 3-4 REGIONS: CPT | Mod: AT | Performed by: CHIROPRACTOR

## 2017-04-27 NOTE — PROGRESS NOTES
Visit #:  6 of 6, based on treatment plan    Subjective:  Jessica Jordan is a 67 year old female who is seen in f/u up for:        Segmental dysfunction of pelvic region  Lumbago  Segmental dysfunction of lumbar region  Spasm of muscle  Thoracic segment dysfunction  Cervicalgia  Cervical segment dysfunction.     Since last visit on 4/6/2017,  Jessica Jordan reports the following changes: Pain immediately after last treatment: 010 and their pain level today 0/10.  Jessica reports that her low back and hips are feeling better.  She is not having any pain though she is feeling some stiffness.  Overall she is feeling improved.    Area of chief complaint:  Cervical and Lumbar :  Symptoms are graded at 0/10. The quality is described as achey.  Motion has increased, but is still not normal. Patient feels that they are improved due to a reduction in symptoms.        Objective:  The following was observed:    P: palpatory tenderness, R piriformis and upper traps    A: static palpation demonstrates intersegmental asymmetry , pelvis, lumbar, thoracic and cervical spine     R: motion palpation notes restricted motion, :  C6 Right rotation restricted  C7 Right rotation restricted  T1 Right rotation restricted  T2 Right rotation restricted  T6 Extension restriction  T7 Extension restriction  T8 Extension restriction  L4 Right rotation restricted  L5 Right rotation restricted  PSIS Right Extension restriction    T: hypertonicity at: Piriformis and Traps R>>L      Assessment:    Segmental spinal dysfunction/restrictions found at:  C6   C7   T1   T2   T6  T7  T8  L4  L5  PSIS Right    Diagnoses:      1. Segmental dysfunction of pelvic region    2. Lumbago    3. Segmental dysfunction of lumbar region    4. Spasm of muscle    5. Thoracic segment dysfunction    6. Cervicalgia    7. Cervical segment dysfunction        Patient's condition:  Patient had restrictions pre-manipulation    Treatment effectiveness:  Post manipulation there  is better intersegmental movement and Patient claims to feel looser post manipulation      Procedures:  CMT:  67606 Chiropractic manipulative treatment 3-4 regions performed   Cervical: Activator, C6, C7 , Prone  Thoracic: Activator, T1, T2, T6, T7, T8, Prone  Lumbar: Activator, L4, L5, Prone  Pelvis: Drop Table, PSIS Right , Prone    Modalities:  None performed this visit    Therapeutic procedures:  None    Prognosis: Good    Progress towards Goals: Patient is making progress towards the goal     Response to Treatment:   Reduction in symptoms as reported by patient      Recommendations:    Instructions:ice 20 minutes every other hour as needed and stretch as instructed at visit    Follow-up:  Return to care if symptoms return

## 2017-05-15 ENCOUNTER — OFFICE VISIT (OUTPATIENT)
Dept: FAMILY MEDICINE | Facility: CLINIC | Age: 67
End: 2017-05-15
Payer: COMMERCIAL

## 2017-05-15 VITALS
OXYGEN SATURATION: 100 % | BODY MASS INDEX: 22.37 KG/M2 | WEIGHT: 138.6 LBS | TEMPERATURE: 97.9 F | HEART RATE: 76 BPM | DIASTOLIC BLOOD PRESSURE: 72 MMHG | SYSTOLIC BLOOD PRESSURE: 120 MMHG

## 2017-05-15 DIAGNOSIS — F33.1 MODERATE RECURRENT MAJOR DEPRESSION (H): Primary | ICD-10-CM

## 2017-05-15 PROCEDURE — 99213 OFFICE O/P EST LOW 20 MIN: CPT | Performed by: NURSE PRACTITIONER

## 2017-05-15 RX ORDER — FLUOXETINE 10 MG/1
10 CAPSULE ORAL DAILY
Qty: 60 CAPSULE | Refills: 1 | Status: SHIPPED | OUTPATIENT
Start: 2017-05-15 | End: 2017-09-18

## 2017-05-15 ASSESSMENT — PAIN SCALES - GENERAL: PAINLEVEL: NO PAIN (0)

## 2017-05-15 ASSESSMENT — PATIENT HEALTH QUESTIONNAIRE - PHQ9: 5. POOR APPETITE OR OVEREATING: MORE THAN HALF THE DAYS

## 2017-05-15 ASSESSMENT — ANXIETY QUESTIONNAIRES
5. BEING SO RESTLESS THAT IT IS HARD TO SIT STILL: SEVERAL DAYS
GAD7 TOTAL SCORE: 12
IF YOU CHECKED OFF ANY PROBLEMS ON THIS QUESTIONNAIRE, HOW DIFFICULT HAVE THESE PROBLEMS MADE IT FOR YOU TO DO YOUR WORK, TAKE CARE OF THINGS AT HOME, OR GET ALONG WITH OTHER PEOPLE: VERY DIFFICULT
3. WORRYING TOO MUCH ABOUT DIFFERENT THINGS: MORE THAN HALF THE DAYS
1. FEELING NERVOUS, ANXIOUS, OR ON EDGE: MORE THAN HALF THE DAYS
2. NOT BEING ABLE TO STOP OR CONTROL WORRYING: MORE THAN HALF THE DAYS
7. FEELING AFRAID AS IF SOMETHING AWFUL MIGHT HAPPEN: SEVERAL DAYS
6. BECOMING EASILY ANNOYED OR IRRITABLE: MORE THAN HALF THE DAYS

## 2017-05-15 NOTE — NURSING NOTE
"Chief Complaint   Patient presents with     Depression     Discuss getting back on meds.       Initial /72 (BP Location: Right arm, Cuff Size: Adult Regular)  Pulse 76  Temp 97.9  F (36.6  C) (Oral)  Wt 138 lb 9.6 oz (62.9 kg)  LMP 10/27/1992  SpO2 100%  Breastfeeding? No  BMI 22.37 kg/m2 Estimated body mass index is 22.37 kg/(m^2) as calculated from the following:    Height as of 3/6/17: 5' 6\" (1.676 m).    Weight as of this encounter: 138 lb 9.6 oz (62.9 kg).  Medication Reconciliation: complete       Sun Harris CMA      "

## 2017-05-15 NOTE — MR AVS SNAPSHOT
After Visit Summary   5/15/2017    Jessica Jordan    MRN: [id number]           Patient Information     Date Of Birth          1950        Visit Information        Provider Department      5/15/2017 9:00 AM Celeste Jacobs APRN [facility]        Today's Diagnoses     Moderate recurrent major depression (H)    -  1      Care Instructions    Follow-up in 1 month for recheck.      [facility]    If you have any questions regarding to your visit please contact your care team:     Team Pink:   Clinic Hours Telephone Number   Internal Medicine:  Dr. Yodit Jacobs, NP       7am-7pm  Monday - Thursday   7am-5pm  Fridays  [phone]  (Appointment scheduling available 24/7)    Questions about your visit?  Team Line  [phone]   Urgent Care - [place] [facility] - 11am-9pm Monday-Friday Saturday-Sunday- 9am-5pm   [place] - 5pm-9pm Monday-Friday Saturday-Sunday- 9am-5pm  [phone] - [place]   [phone] - [place]       What options do I have for visits at the clinic other than the traditional office visit?  To expand how we care for you, many of our providers are utilizing electronic visits (e-visits) and telephone visits, when medically appropriate, for interactions with their patients rather than a visit in the clinic.   We also offer nurse visits for many medical concerns. Just like any other service, we will bill your insurance company for this type of visit based on time spent on the phone with your provider. Not all insurance companies cover these visits. Please check with your medical insurance if this type of visit is covered. You will be responsible for any charges that are not paid by your insurance.      E-visits via [employer]:  generally incur a $35.00 fee.  Telephone visits:  Time spent on the phone: *charged based on time that is spent on the phone in increments of 10 minutes.  Estimated cost:   5-10 mins $30.00   11-20 mins. $59.00   21-30 mins. $85.00   Use [employer] (secure email communication and access to your chart) to send your primary care provider a message or make an appointment. Ask someone on your Team how to sign up for Buddy.    For a Price Quote for your services, please call our [employer] Line at [phone].    As always, Thank you for trusting us with your health care needs!    Sun Harris CMA          Follow-ups after your visit        Additional Services     MENTAL HEALTH REFERRAL       Your provider has referred you to: [facility]: [facility] - Counseling (Individual/Couples/Family) - [facility] [phone] [email or web address]/ *Patient will be contacted by [facility] scheduling partner, [facility] ([facility]), to schedule an appointment. Patients may also call [facility] to schedule.    All scheduling is subject to the client's specific insurance plan & benefits, provider/location availability, and provider clinical specialities.  Please arrive 15 minutes early for your first appointment and bring your completed paperwork.    Please be aware that coverage of these services is subject to the terms and limitations of your health insurance plan.  Call member services at your health plan with any benefit or coverage questions.                  Who to contact     If you have questions or need follow up information about today's clinic visit or your schedule please contact [facility] directly at [phone].  Normal or non-critical lab and imaging results will be communicated to you by MyChart, letter or phone within 4 business days after the clinic has received the results. If you do not hear from us within 7 days, please contact the clinic through MyChart or phone. If you have a critical or abnormal lab result, we will notify you by phone as soon as possible.  Submit  refill requests through "[employer] or call your pharmacy and they will forward the refill request to us. Please allow 3 business days for your refill to be completed.          Additional Information About Your Visit        T[employer] Information     "[employer] gives you secure access to your electronic health record. If you see a primary care provider, you can also send messages to your care team and make appointments. If you have questions, please call your primary care clinic.  If you do not have a primary care provider, please call [phone] and they will assist you.        Care EveryWhere ID     This is your Care EveryWhere ID. This could be used by other organizations to access your [facility] medical records  [id number]        Your Vitals Were     Pulse Temperature Last Period Pulse Oximetry Breastfeeding? BMI (Body Mass Index)    76 97.9  F (36.6  C) (Oral) 10/27/1992 100% No 22.37 kg/m2       Blood Pressure from Last 3 Encounters:   05/15/17 120/72   03/06/17 124/80   10/27/15 119/73    Weight from Last 3 Encounters:   05/15/17 138 lb 9.6 oz (62.9 kg)   03/06/17 134 lb (60.8 kg)   10/27/15 144 lb 12.8 oz (65.7 kg)              We Performed the Following     MENTAL HEALTH REFERRAL          Today's Medication Changes          These changes are accurate as of: 5/15/17  9:46 AM.  If you have any questions, ask your nurse or doctor.               Start taking these medicines.        Dose/Directions    FLUoxetine 10 MG capsule   Commonly known as:  PROzac   Used for:  Moderate recurrent major depression (H)   Started by:  Celeste Jacobs APRN CNP        Dose:  10 mg   Take 1 capsule (10 mg) by mouth daily For 1 week, then increase to 20 mg daily.   Quantity:  60 capsule   Refills:  1            Where to get your medicines      These medications were sent to [id number] - [place] - [address] [address]     Phone:  [phone]     FLUoxetine 10 MG  capsule                Primary Care Provider Office Phone # Fax #    MAGALIS [facility] [phone]       [address] [address]        Thank you!     Thank you for choosing [facility]  for your care. Our goal is always to provide you with excellent care. Hearing back from our patients is one way we can continue to improve our services. Please take a few minutes to complete the written survey that you may receive in the mail after your visit with us. Thank you!             Your Updated Medication List - Protect others around you: Learn how to safely use, store and throw away your medicines at www.disposemymeds.org.          This list is accurate as of: 5/15/17  9:46 AM.  Always use your most recent med list.                   Brand Name Dispense Instructions for use    Biotin 7500 MCG Tabs          calcium carbonate 500 MG tablet    OS-FRANKI 500 mg [place]     Take 500 mg by mouth 2 times daily       cyanocolbalamin 500 MCG tablet    vitamin  B-12     Take 500 mcg by mouth daily       FLUoxetine 10 MG capsule    PROzac    60 capsule    Take 1 capsule (10 mg) by mouth daily For 1 week, then increase to 20 mg daily.       MULTIVITAMIN ADULT PO      Take by mouth daily       omega 3 1000 MG Caps      Take 2 g by mouth       VITAMIN D3 PO      Take by mouth daily

## 2017-05-15 NOTE — PATIENT INSTRUCTIONS
Follow-up in 1 month for recheck.      [facility]    If you have any questions regarding to your visit please contact your care team:     Team Pink:   Clinic Hours Telephone Number   Internal Medicine:  Dr. Yodit Jacobs, NP       7am-7pm  Monday - Thursday   7am-5pm  Fridays  [phone]  (Appointment scheduling available 24/7)    Questions about your visit?  Team Line  [phone]   [facility] [place] [facility] [place] [facility] 11am-9pm Monday-Friday Saturday-Sunday- 9am-5pm   [place] - 5pm-9pm Monday-Friday Saturday-Sunday- 9am-5pm  [phone] - [place]   [phone] - [place]       What options do I have for visits at the clinic other than the traditional office visit?  To expand how we care for you, many of our providers are utilizing electronic visits (e-visits) and telephone visits, when medically appropriate, for interactions with their patients rather than a visit in the clinic.   We also offer nurse visits for many medical concerns. Just like any other service, we will bill your insurance company for this type of visit based on time spent on the phone with your provider. Not all insurance companies cover these visits. Please check with your medical insurance if this type of visit is covered. You will be responsible for any charges that are not paid by your insurance.      E-visits via [employer]:  generally incur a $35.00 fee.  Telephone visits:  Time spent on the phone: *charged based on time that is spent on the phone in increments of 10 minutes. Estimated cost:   5-10 mins $30.00   11-20 mins. $59.00   21-30 mins. $85.00   Use [employer] (secure email communication and access to your chart) to send your primary care provider a message or make an appointment. Ask someone on your Team how to sign up for [employer].    For a Price Quote for your services, please call our Consumer Price Line at [phone].    As always, Thank you for trusting  us with your health care needs!    Sun Harris, CMA

## 2017-05-16 ASSESSMENT — ANXIETY QUESTIONNAIRES: GAD7 TOTAL SCORE: 12

## 2017-05-16 ASSESSMENT — PATIENT HEALTH QUESTIONNAIRE - PHQ9: SUM OF ALL RESPONSES TO PHQ QUESTIONS 1-9: 12

## 2017-09-15 NOTE — PROGRESS NOTES
SUBJECTIVE:   Jessica Jordan is a 67 year old female who presents to clinic today for the following health issues:      Chief Complaint   Patient presents with     Other     requesting labs for hormones and testosterone.     Health Maintenance     Fall Risk     Derm Problem     moles on chest, discuss referral     Flu Shot     wants to discuss this first     Patient continues to feel fatigued.  She feels her depression is well controlled.  She did not resume prozac after last visit and instead attended counseling, which she felt helped.  She notes that she is restless at night and is wondering if she needs her hormones tested.  She saw a provider on TV stating that she should have her testosterone tested for fatigue.  Patient is post menopausal.  She denies excessive hair growth, deepening of voice.  Patient denies snoring, am headache.  She notes she gets up to urinate 4 times per night.      She complains of nasal congestion and clear rhinorrhea.  She has taken cetirizine without much improvement.  Symptoms have been ongoing for the past month.  Patient denies fever, cough, chest pain, shortness of breath.  Patient has two moles to her chest that have been present for the past 10 years.  She denies changes to the moles, but wants to make sure they are okay.      Problem list and histories reviewed & adjusted, as indicated.  Additional history: as documented    Patient Active Problem List   Diagnosis     Moderate recurrent major depression (H)     Vitamin D deficiency     Family history of ovarian cancer     Iron deficiency anemia due to chronic blood loss     Osteopenia     Past Surgical History:   Procedure Laterality Date     APPENDECTOMY       BREAST SURGERY      Bilateral breast implants     HYSTERECTOMY, PAP NO LONGER INDICATED         Social History   Substance Use Topics     Smoking status: Never Smoker     Smokeless tobacco: Never Used     Alcohol use Yes      Comment: once a week     History reviewed.  No pertinent family history.      Current Outpatient Prescriptions   Medication Sig Dispense Refill     fluticasone (FLONASE) 50 MCG/ACT spray Spray 1-2 sprays into both nostrils daily 1 Bottle 11     cyanocolbalamin (VITAMIN  B-12) 500 MCG tablet Take 500 mcg by mouth daily       calcium carbonate (OS-FRANKI 500 MG [place]. [place]) 500 MG tablet Take 500 mg by mouth 2 times daily       Biotin 7500 MCG TABS        omega 3 1000 MG CAPS Take 2 g by mouth       Cholecalciferol (VITAMIN D3 PO) Take by mouth daily       Allergies   Allergen Reactions     Codeine      Penicillins      Seasonal Allergies Other (See Comments)     Headache, runny nose     BP Readings from Last 3 Encounters:   09/18/17 112/82   05/15/17 120/72   03/06/17 124/80    Wt Readings from Last 3 Encounters:   09/18/17 136 lb 9.6 oz (62 kg)   05/15/17 138 lb 9.6 oz (62.9 kg)   03/06/17 134 lb (60.8 kg)                  Labs reviewed in EPIC        Reviewed and updated as needed this visit by clinical staff       Reviewed and updated as needed this visit by Provider         ROS:  Constitutional, HEENT, cardiovascular, pulmonary, gi and gu systems are negative, except as otherwise noted.      OBJECTIVE:   /82 (BP Location: Right arm, Cuff Size: Adult Regular)  Pulse 82  Temp 97.6  F (36.4  C) (Oral)  Wt 136 lb 9.6 oz (62 kg)  LMP 10/27/1992  SpO2 100%  Breastfeeding? No  BMI 22.05 kg/m2  Body mass index is 22.05 kg/(m^2).  GENERAL: healthy, alert and no distress  EYES: Eyes grossly normal to inspection, PERRL and conjunctivae and sclerae normal  HENT: normal cephalic/atraumatic, ear canals and TM's normal, nose and mouth without ulcers or lesions, nasal mucosa edematous , oropharynx clear and oral mucous membranes moist  NECK: no adenopathy, no asymmetry, masses, or scars and thyroid normal to palpation  RESP: lungs clear to auscultation - no rales, rhonchi or wheezes  CV: regular rate and rhythm, normal S1 S2, no S3 or S4, no murmur, click or rub,  no peripheral edema and peripheral pulses strong  MS: no gross musculoskeletal defects noted, no edema  SKIN: keratoses - seborrheic    Diagnostic Test Results:  pending    ASSESSMENT/PLAN:     1. Fatigue, unspecified type  Will recheck TSH.  Patient try over the counter melatonin for sleep.  We discussed hormone testing and I do not feel that testing her testosterone would be beneficial, as she shows no signs of virilization.  Due to be postmenopausal, checking FSH, LH would not be helpful as well.  Patient verbalized understanding.  Patient declines medication for overactive bladder and will try to limit her water intake before bedtime.  - TSH with free T4 reflex    2. Vitamin D deficiency    - Vitamin D Deficiency    3. Moderate recurrent major depression (H)  Stable per patient.    4. Benign neoplasm of skin of trunk, except scrotum  Patient reassured lesions were benign.    5. Acute seasonal allergic rhinitis due to pollen    - fluticasone (FLONASE) 50 MCG/ACT spray; Spray 1-2 sprays into both nostrils daily  Dispense: 1 Bottle; Refill: 11    6. Need for prophylactic vaccination and inoculation against influenza    - FLU VACCINE, INCREASED ANTIGEN, PRESV FREE, AGE 65+ [02200]  - Vaccine Administration, Initial [68435]    FUTURE APPOINTMENTS:       - Follow-up for annual visit or as needed    MAGALIS Mcneil CNP  [facility]

## 2017-09-18 ENCOUNTER — OFFICE VISIT (OUTPATIENT)
Dept: INTERNAL MEDICINE | Facility: CLINIC | Age: 67
End: 2017-09-18
Payer: COMMERCIAL

## 2017-09-18 VITALS
HEART RATE: 82 BPM | OXYGEN SATURATION: 100 % | WEIGHT: 136.6 LBS | SYSTOLIC BLOOD PRESSURE: 112 MMHG | DIASTOLIC BLOOD PRESSURE: 82 MMHG | TEMPERATURE: 97.6 F | BODY MASS INDEX: 22.05 KG/M2

## 2017-09-18 DIAGNOSIS — J30.1 ACUTE SEASONAL ALLERGIC RHINITIS DUE TO POLLEN: ICD-10-CM

## 2017-09-18 DIAGNOSIS — E55.9 VITAMIN D DEFICIENCY: ICD-10-CM

## 2017-09-18 DIAGNOSIS — Z23 NEED FOR PROPHYLACTIC VACCINATION AND INOCULATION AGAINST INFLUENZA: ICD-10-CM

## 2017-09-18 DIAGNOSIS — F33.1 MODERATE RECURRENT MAJOR DEPRESSION (H): ICD-10-CM

## 2017-09-18 DIAGNOSIS — R53.83 FATIGUE, UNSPECIFIED TYPE: Primary | ICD-10-CM

## 2017-09-18 DIAGNOSIS — D23.5 BENIGN NEOPLASM OF SKIN OF TRUNK, EXCEPT SCROTUM: ICD-10-CM

## 2017-09-18 LAB
DEPRECATED CALCIDIOL+CALCIFEROL SERPL-MC: 44 UG/L (ref 20–75)
TSH SERPL DL<=0.005 MIU/L-ACNC: 1.5 MU/L (ref 0.4–4)

## 2017-09-18 PROCEDURE — 84443 ASSAY THYROID STIM HORMONE: CPT | Performed by: NURSE PRACTITIONER

## 2017-09-18 PROCEDURE — 82306 VITAMIN D 25 HYDROXY: CPT | Performed by: NURSE PRACTITIONER

## 2017-09-18 PROCEDURE — G0008 ADMIN INFLUENZA VIRUS VAC: HCPCS | Performed by: NURSE PRACTITIONER

## 2017-09-18 PROCEDURE — 36415 COLL VENOUS BLD VENIPUNCTURE: CPT | Performed by: NURSE PRACTITIONER

## 2017-09-18 PROCEDURE — 90662 IIV NO PRSV INCREASED AG IM: CPT | Performed by: NURSE PRACTITIONER

## 2017-09-18 PROCEDURE — 99214 OFFICE O/P EST MOD 30 MIN: CPT | Mod: 25 | Performed by: NURSE PRACTITIONER

## 2017-09-18 RX ORDER — FLUTICASONE PROPIONATE 50 MCG
1-2 SPRAY, SUSPENSION (ML) NASAL DAILY
Qty: 1 BOTTLE | Refills: 11 | Status: SHIPPED | OUTPATIENT
Start: 2017-09-18 | End: 2017-11-24

## 2017-09-18 ASSESSMENT — PAIN SCALES - GENERAL: PAINLEVEL: NO PAIN (0)

## 2017-09-18 NOTE — PROGRESS NOTES
Dear Jessica,    Your recent test results are attached.      Normal thyroid.    If you have any questions please feel free to contact [phone] or myself via [employer].    Sincerely,  Celeste Jacobs, CNP

## 2017-09-18 NOTE — MR AVS SNAPSHOT
After Visit Summary   9/18/2017    Jessica Jordan    MRN: [id number]           Patient Information     Date Of Birth          1950        Visit Information        Provider Department      9/18/2017 8:40 AM Celeste Jacobs APRN [facility]        Today's Diagnoses     Fatigue, unspecified type    -  1    Vitamin D deficiency        Moderate recurrent major depression (H)        Benign neoplasm of skin of trunk, except scrotum        Acute seasonal allergic rhinitis due to pollen        Need for prophylactic vaccination and inoculation against influenza          Care Instructions    Try over the counter melatonin 3 mg 2-3 hours before bedtime.  Try flonase nasal spray and over the counter claritin, zyrtec, or allegra.        At your visit today, we discussed your risk for falls and preventive options.    Fall Prevention  Falls often occur due to slipping, tripping or losing your balance. Millions of people fall every year and injure themselves. Here are ways to reduce your risk of falling again.    Think about your fall, was there anything that caused your fall that can be fixed, removed, or replaced?    Make your home safe by keeping walkways clear of objects you may trip over.    Use non-slip pads under rugs. Do not use area rugs or small throw rugs.    Use non-slip mats in bathtubs and showers.    Install handrails and lights on staircases.    Do not walk in poorly lit areas.    Do not stand on chairs or wobbly ladders.    Use caution when reaching overhead or looking upward. This position can cause a loss of balance.    Be sure your shoes fit properly, have non-slip bottoms and are in good condition.     Wear shoes both inside and out. Avoid going barefoot or wearing slippers.    Be cautious when going up and down stairs, curbs, and when walking on uneven sidewalks.    If your balance is poor, consider using a cane or walker.    If your fall was related to alcohol  use, stop or limit alcohol intake.     If your fall was related to use of sleeping medicines, talk to your doctor about this. You may need to reduce your dosage at bedtime if you awaken during the night to go to the bathroom.      To reduce the need for nighttime bathroom trips:    Avoid drinking fluids for several hours before going to bed    Empty your bladder before going to bed    Men can keep a urinal at the bedside    Stay as active as you can. Balance, flexibility, strength, and endurance all come from exercise. They all play a role in preventing falls. Ask your healthcare provider which types of activity are right for you.    Get your vision checked on a regular basis.    If you have pets, know where they are before you stand up or walk so you don't trip over them.    Use night lights.  Date Last Reviewed: 11/5/2015 2000-2017 [employer]. [address] [place] [address]. All rights reserved. This information is not intended as a substitute for professional medical care. Always follow your healthcare professional's instructions.      [facility]    If you have any questions regarding to your visit please contact your care team:     Team Pink:   Clinic Hours Telephone Number   Internal Medicine:  Dr. Yodit Jacosb, NP       7am-7pm  Monday - Thursday   7am-5pm  Fridays  [phone]  (Appointment scheduling available 24/7)    Questions about your visit?  Team Line  [phone]   Urgent Care - [facility] - 11am-9pm Monday-Friday Saturday-Sunday- 9am-5pm   [place] - 5pm-9pm Monday-Friday Saturday-Sunday- 9am-5pm  [phone] - [place]   [phone] - [place]       What options do I have for visits at the clinic other than the traditional office visit?  To expand how we care for you, many of our providers are utilizing electronic visits (e-visits) and telephone visits, when medically appropriate, for  interactions with their patients rather than a visit in the clinic.   We also offer nurse visits for many medical concerns. Just like any other service, we will bill your insurance company for this type of visit based on time spent on the phone with your provider. Not all insurance companies cover these visits. Please check with your medical insurance if this type of visit is covered. You will be responsible for any charges that are not paid by your insurance.      E-visits via [employer]art:  generally incur a $35.00 fee.  Telephone visits:  Time spent on the phone: *charged based on time that is spent on the phone in increments of 10 minutes. Estimated cost:   5-10 mins $30.00   11-20 mins. $59.00   21-30 mins. $85.00   Use [employer] (secure email communication and access to your chart) to send your primary care provider a message or make an appointment. Ask someone on your Team how to sign up for [employer].    For a Price Quote for your services, please call our [employer] Line at [phone].    As always, Thank you for trusting us with your health care needs!    Discharged by Micaela ROMERO CMA ([facility])            Follow-ups after your visit        Your next 10 appointments already scheduled     Sep 21, 2017 10:00 AM CDT   KANDICE Chiropractor with NEWTON Lazcano Chiro ([facility] RENETTA Lizarraga)    [address] [address]  [place] [id number] [phone]            Sep 22, 2017 10:15 AM CDT   MA SCREENING DIGITAL BILATERAL with [id number]   [facility]    [address] [place] [id number] [phone]           Do not use any powder, lotion or deodorant under your arms or on your breast. If you do, we will ask you to remove it before your exam.  Wear comfortable, two-piece clothing.  If you have any allergies, tell your care team.  Bring any previous mammograms from other facilities or have them mailed to the breast center.              Who to  contact     If you have questions or need follow up information about today's clinic visit or your schedule please contact [facility] [place] [facility] directly at [phone].  Normal or non-critical lab and imaging results will be communicated to you by Propablehart, letter or phone within 4 business days after the clinic has received the results. If you do not hear from us within 7 days, please contact the clinic through Prop[employer] or phone. If you have a critical or abnormal lab result, we will notify you by phone as soon as possible.  Submit refill requests through [employer] or call your pharmacy and they will forward the refill request to us. Please allow 3 business days for your refill to be completed.          Additional Information About Your Visit        [employer] Information     [employer] gives you secure access to your electronic health record. If you see a primary care provider, you can also send messages to your care team and make appointments. If you have questions, please call your primary care clinic.  If you do not have a primary care provider, please call [phone] and they will assist you.        Care EveryWhere ID     This is your Care EveryWhere ID. This could be used by other organizations to access your [facility] medical records  [id number]        Your Vitals Were     Pulse Temperature Last Period Pulse Oximetry Breastfeeding? BMI (Body Mass Index)    82 97.6  F (36.4  C) (Oral) 10/27/1992 100% No 22.05 kg/m2       Blood Pressure from Last 3 Encounters:   09/18/17 112/82   05/15/17 120/72   03/06/17 124/80    Weight from Last 3 Encounters:   09/18/17 136 lb 9.6 oz (62 kg)   05/15/17 138 lb 9.6 oz (62.9 kg)   03/06/17 134 lb (60.8 kg)              We Performed the Following     TSH with free T4 reflex     Vitamin D Deficiency          Today's Medication Changes          These changes are accurate as of: 9/18/17  9:30 AM.  If you have any questions, ask your nurse or doctor.               Start taking these  medicines.        Dose/Directions    fluticasone 50 MCG/ACT spray   Commonly known as:  FLONASE   Used for:  Acute seasonal allergic rhinitis due to pollen   Started by:  Celeste Jacobs APRN CNP        Dose:  1-2 spray   Spray 1-2 sprays into both nostrils daily   Quantity:  1 Bottle   Refills:  11            Where to get your medicines      These medications were sent to [facility] [address] [facility] [address] [facility] [address] [facility]  [address] [facility] [address]     Phone:  [phone]     fluticasone 50 MCG/ACT spray                Primary Care Provider Office Phone # Fax #    [place] [facility] [phone] [address] [facility] [address]        Equal Access to Services     HAYDER FINNEY : Hadii aad ku hadasho Soomaali, waaxda luqadaha, qaybta kaalmada adeegyada, waxay franciscoin hayopal montague . So [facility] [phone].    ATENCIÓN: Si habla español, tiene a mustafa disposición servicios gratuitos de asistencia lingüística. [facility] [phone].    We comply with applicable federal civil rights laws and [place] laws. We do not discriminate on the basis of race, color, national origin, age, disability sex, sexual orientation or gender identity.            Thank you!     Thank you for choosing [facility]  for your care. Our goal is always to provide you with excellent care. Hearing back from our patients is one way we can continue to improve our services. Please take a few minutes to complete the written survey that you may receive in the mail after your visit with us. Thank you!             Your Updated Medication List - Protect others around you: Learn how to safely use, store and throw away your medicines at www.disposemymeds.org.          This list is accurate as of: 9/18/17  9:30 AM.  Always use your most recent med list.                   Brand Name Dispense Instructions for use Diagnosis    Biotin 7500 MCG Tabs            calcium carbonate 1250 MG tablet    OS-FRANKI 500 mg [place]. Ca     Take 500 mg by mouth 2 times daily        cyanocolbalamin 500 MCG tablet    vitamin  B-12     Take 500 mcg by mouth daily        fluticasone 50 MCG/ACT spray    FLONASE    1 Bottle    Spray 1-2 sprays into both nostrils daily    Acute seasonal allergic rhinitis due to pollen       omega 3 1000 MG Caps      Take 2 g by mouth        VITAMIN D3 PO      Take by mouth daily

## 2017-09-18 NOTE — PATIENT INSTRUCTIONS
Try over the counter melatonin 3 mg 2-3 hours before bedtime.  Try flonase nasal spray and over the counter claritin, zyrtec, or allegra.        At your visit today, we discussed your risk for falls and preventive options.    Fall Prevention  Falls often occur due to slipping, tripping or losing your balance. Millions of people fall every year and injure themselves. Here are ways to reduce your risk of falling again.    Think about your fall, was there anything that caused your fall that can be fixed, removed, or replaced?    Make your home safe by keeping walkways clear of objects you may trip over.    Use non-slip pads under rugs. Do not use area rugs or small throw rugs.    Use non-slip mats in bathtubs and showers.    Install handrails and lights on staircases.    Do not walk in poorly lit areas.    Do not stand on chairs or wobbly ladders.    Use caution when reaching overhead or looking upward. This position can cause a loss of balance.    Be sure your shoes fit properly, have non-slip bottoms and are in good condition.     Wear shoes both inside and out. Avoid going barefoot or wearing slippers.    Be cautious when going up and down stairs, curbs, and when walking on uneven sidewalks.    If your balance is poor, consider using a cane or walker.    If your fall was related to alcohol use, stop or limit alcohol intake.     If your fall was related to use of sleeping medicines, talk to your doctor about this. You may need to reduce your dosage at bedtime if you awaken during the night to go to the bathroom.      To reduce the need for nighttime bathroom trips:    Avoid drinking fluids for several hours before going to bed    Empty your bladder before going to bed    Men can keep a urinal at the bedside    Stay as active as you can. Balance, flexibility, strength, and endurance all come from exercise. They all play a role in preventing falls. Ask your healthcare provider which types of activity are right for  you.    Get your vision checked on a regular basis.    If you have pets, know where they are before you stand up or walk so you don't trip over them.    Use night lights.  Date Last Reviewed: 11/5/2015 2000-2017 [employer]. [address] [place] [address]. All rights reserved. This information is not intended as a substitute for professional medical care. Always follow your healthcare professional's instructions.      [facility]    If you have any questions regarding to your visit please contact your care team:     Team Pink:   Clinic Hours Telephone Number   Internal Medicine:  Dr. Yodit Jacobs, NP       7am-7pm  Monday - Thursday   7am-5pm  Fridays  [phone]  (Appointment scheduling available 24/7)    Questions about your visit?  Team Line  [phone]   Urgent Care - [place] Chairez and [place] - 11am-9pm Monday-Friday Saturday-Sunday- 9am-5pm   [place] - 5pm-9pm Monday-Friday Saturday-Sunday- 9am-5pm  [phone] - [place]   [phone] - [place]       What options do I have for visits at the clinic other than the traditional office visit?  To expand how we care for you, many of our providers are utilizing electronic visits (e-visits) and telephone visits, when medically appropriate, for interactions with their patients rather than a visit in the clinic.   We also offer nurse visits for many medical concerns. Just like any other service, we will bill your insurance company for this type of visit based on time spent on the phone with your provider. Not all insurance companies cover these visits. Please check with your medical insurance if this type of visit is covered. You will be responsible for any charges that are not paid by your insurance.      E-visits via [employer]:  generally incur a $35.00 fee.  Telephone visits:  Time spent on the phone: *charged based on time that is spent on the phone in increments  of 10 minutes. Estimated cost:   5-10 mins $30.00   11-20 mins. $59.00   21-30 mins. $85.00   Use [employer]hart (secure email communication and access to your chart) to send your primary care provider a message or make an appointment. Ask someone on your Team how to sign up for [employer].    For a Price Quote for your services, please call our [employer] Line at [phone].    As always, Thank you for trusting us with your health care needs!    Discharged by Micaela ROMERO CMA ([facility])

## 2017-09-18 NOTE — NURSING NOTE
"Chief Complaint   Patient presents with     Other     requesting labs for hormones and testosterone.     Health Maintenance     Fall Risk     Derm Problem     moles on chest, discuss referral     Flu Shot     wants to discuss this first       Initial /82 (BP Location: Right arm, Cuff Size: Adult Regular)  Pulse 82  Temp 97.6  F (36.4  C) (Oral)  Wt 136 lb 9.6 oz (62 kg)  LMP 10/27/1992  SpO2 100%  Breastfeeding? No  BMI 22.05 kg/m2 Estimated body mass index is 22.05 kg/(m^2) as calculated from the following:    Height as of 3/6/17: 5' 6\" (1.676 m).    Weight as of this encounter: 136 lb 9.6 oz (62 kg).  Medication Reconciliation: complete    "

## 2017-09-18 NOTE — PROGRESS NOTES
Injectable Influenza Immunization Documentation    1.  Is the person to be vaccinated sick today? no    2. Does the person to be vaccinated have an allergy to a component   of the vaccine? no    3. Has the person to be vaccinated ever had a serious reaction   to influenza vaccine in the past? no    4. Has the person to be vaccinated ever had Guillain-Barré syndrome? no    Form completed by Micaela ROMERO CMA ([facility])

## 2017-09-19 NOTE — PROGRESS NOTES
Dear Jessica,    Your recent test results are attached.      Normal Vitamin D.    If you have any questions please feel free to contact [phone] or myself via [email or web address].    Sincerely,  Celeste Jacobs, CNP

## 2017-09-21 ENCOUNTER — THERAPY VISIT (OUTPATIENT)
Dept: CHIROPRACTIC MEDICINE | Facility: CLINIC | Age: 67
End: 2017-09-21
Payer: COMMERCIAL

## 2017-09-21 DIAGNOSIS — M99.01 CERVICAL SEGMENT DYSFUNCTION: Primary | ICD-10-CM

## 2017-09-21 DIAGNOSIS — M62.838 SPASM OF MUSCLE: ICD-10-CM

## 2017-09-21 DIAGNOSIS — M54.2 CERVICALGIA: ICD-10-CM

## 2017-09-21 DIAGNOSIS — M54.50 LUMBAGO: ICD-10-CM

## 2017-09-21 DIAGNOSIS — M99.03 SEGMENTAL DYSFUNCTION OF LUMBAR REGION: ICD-10-CM

## 2017-09-21 DIAGNOSIS — M99.05 SEGMENTAL DYSFUNCTION OF PELVIC REGION: ICD-10-CM

## 2017-09-21 DIAGNOSIS — M99.02 THORACIC SEGMENT DYSFUNCTION: ICD-10-CM

## 2017-09-21 PROCEDURE — 98941 CHIROPRACT MANJ 3-4 REGIONS: CPT | Mod: AT | Performed by: CHIROPRACTOR

## 2017-09-21 PROCEDURE — 99212 OFFICE O/P EST SF 10 MIN: CPT | Mod: 25 | Performed by: CHIROPRACTOR

## 2017-09-21 NOTE — PROGRESS NOTES
Visit #:  7, based on treatment plan    Subjective:  Jessica Jordan is a 67 year old female who is seen in f/u up for:        Segmental dysfunction of pelvic region  Lumbago  Segmental dysfunction of lumbar region  Spasm of muscle  Thoracic segment dysfunction  Cervicalgia  Cervical segment dysfunction.     Since last visit on 4/27/2017,  Jessica Jordan reports the following changes: Pain immediately after last treatment: 0/10 and their pain level today 7/10 neck and left upper trap areas.  Jessica reports that her low back and hips are feeling better.  She is not having any pain though she is feeling some stiffness.  Overall she is feeling improved.    Area of chief complaint:  Cervical and Lumbar :  Symptoms are graded at 7/10. The quality is described as achey.  Motion has increased, but is still not normal. Patient feels that they are improved due to a reduction in symptoms.        Objective:  The following was observed:    P: palpatory tenderness, upper traps    A: static palpation demonstrates intersegmental asymmetry , pelvis, lumbar, thoracic and cervical spine     R: motion palpation notes restricted motion, :  C2 left rotation  C6 Right rotation restricted  C7 Right rotation restricted  T1 Right rotation restricted  T2 Right rotation restricted  T6 Extension restriction  T7 Extension restriction  T8 Extension restriction  L4 Right rotation restricted  L5 Right rotation restricted  PSIS Right Extension restriction    T: hypertonicity at: Piriformis and Traps R>>L    Cervical Exam  Range of Motion:         Full active and passive ROM forward flexion, extension, lateral rotation, lateral flexion.    Inspection:         No visible deformity        normal lordotic curvature maintained    Tender:        upper border of trapezius    Non-Tender:        remainder of cervical spine area    Muscle strength:       C4 (shoulder shrug)  symmetric 5/5       C5 (shoulder abduction) symmetric 5/5       C6 (elbow flexion)  symmetric 5/5       C7 (elbow extension) symmetric 5/5       C8 (finger abduction, thumb flexion) symmetric 5/5    Reflexes:       Sensation:       grossly intact througout bilateral upper extremities    Special Tests:       neg (-) Spurling      Lymphatics:        no edema noted in the upper extremities         Assessment:    Segmental spinal dysfunction/restrictions found at:  C6   C7   T1   T2   T6  T7  T8  L4  L5  PSIS Right    Diagnoses:      1. Segmental dysfunction of pelvic region    2. Lumbago    3. Segmental dysfunction of lumbar region    4. Spasm of muscle    5. Thoracic segment dysfunction    6. Cervicalgia    7. Cervical segment dysfunction        Patient's condition:  Patient had restrictions pre-manipulation    Treatment effectiveness:  Post manipulation there is better intersegmental movement and Patient claims to feel looser post manipulation      Procedures:  CMT:  29806 Chiropractic manipulative treatment 3-4 regions performed   Cervical: Activator, C6, C7 , Prone  Thoracic: Activator, T1, T2, T6, T7, T8, Prone  Lumbar: Activator, L4, L5, Prone  Pelvis: Drop Table, PSIS Right , Prone    Modalities:  None performed this visit    Therapeutic procedures:  None    Prognosis: Good    Progress towards Goals: Patient is making progress towards the goal     Response to Treatment:   Reduction in symptoms as reported by patient      Recommendations:    Instructions: ice 20 minutes every other hour as needed and stretch as instructed at visit    Follow-up:  Return to care if symptoms return

## 2017-09-22 ENCOUNTER — RADIANT APPOINTMENT (OUTPATIENT)
Dept: MAMMOGRAPHY | Facility: CLINIC | Age: 67
End: 2017-09-22
Payer: COMMERCIAL

## 2017-09-22 DIAGNOSIS — Z12.31 VISIT FOR SCREENING MAMMOGRAM: ICD-10-CM

## 2017-09-22 PROCEDURE — G0202 SCR MAMMO BI INCL CAD: HCPCS | Mod: TC

## 2017-09-22 PROCEDURE — 77063 BREAST TOMOSYNTHESIS BI: CPT | Mod: TC

## 2017-09-28 ENCOUNTER — THERAPY VISIT (OUTPATIENT)
Dept: CHIROPRACTIC MEDICINE | Facility: CLINIC | Age: 67
End: 2017-09-28
Payer: COMMERCIAL

## 2017-09-28 DIAGNOSIS — M99.02 THORACIC SEGMENT DYSFUNCTION: ICD-10-CM

## 2017-09-28 DIAGNOSIS — M62.838 SPASM OF MUSCLE: ICD-10-CM

## 2017-09-28 DIAGNOSIS — M99.01 CERVICAL SEGMENT DYSFUNCTION: Primary | ICD-10-CM

## 2017-09-28 DIAGNOSIS — M54.2 CERVICALGIA: ICD-10-CM

## 2017-09-28 PROCEDURE — 98940 CHIROPRACT MANJ 1-2 REGIONS: CPT | Mod: AT | Performed by: CHIROPRACTOR

## 2017-09-28 NOTE — PROGRESS NOTES
Visit #:  8, based on treatment plan    Subjective:  Jessica Jordan is a 67 year old female who is seen in f/u up for:        Segmental dysfunction of pelvic region  Lumbago  Segmental dysfunction of lumbar region  Spasm of muscle  Thoracic segment dysfunction  Cervicalgia  Cervical segment dysfunction.     Since last visit on 9/21/2017,  Jessica Jordan reports the following changes: Pain immediately after last treatment: 0/10 and their pain level today 4/10 neck and left upper trap areas.  Jessica reports that her low back and hips are still feeling better.  She is not having any pain in the low back and hips though she is feeling some stiffness.  Overall she is feeling improved.    Area of chief complaint:  Cervical:  Symptoms are graded at 4/10. The quality is described as achey.  Motion has increased, but is still not normal. Patient feels that they are improved due to a reduction in symptoms.        Objective:  The following was observed:    P: palpatory tenderness, upper traps    A: static palpation demonstrates intersegmental asymmetry , pelvis, lumbar, thoracic and cervical spine     R: motion palpation notes restricted motion, :  C2 left rotation  C6 Right rotation restricted  C7 Right rotation restricted  T1 Right rotation restricted  T2 Right rotation restricted  T6 Extension restriction  T7 Extension restriction  T8 Extension restriction    T: hypertonicity at:  Traps R>>L      Assessment:    Segmental spinal dysfunction/restrictions found at:  C6   C7   T1   T2   T6  T7  T8    Diagnoses:      1. Segmental dysfunction of cervical region    2. Cervicalgia   3. Segmental dysfunction of thoracicregion    4. Spasm of muscle                    Patient's condition:  Patient had restrictions pre-manipulation    Treatment effectiveness:  Post manipulation there is better intersegmental movement and Patient claims to feel looser post manipulation      Procedures:  CMT:  78242 Chiropractic manipulative treatment  1-2 regions performed   Cervical: Activator, C6, C7 , Prone  Thoracic: Activator, T1, T2, T6, T7, T8, Prone      Modalities:  None performed this visit    Therapeutic procedures:  None    Prognosis: Good    Progress towards Goals: Patient is making progress towards the goal     Response to Treatment:   Reduction in symptoms as reported by patient      Recommendations:    Instructions: ice 20 minutes every other hour as needed and stretch as instructed at visit    Follow-up:  Return to care if symptoms return

## 2017-09-29 ENCOUNTER — TELEPHONE (OUTPATIENT)
Dept: INTERNAL MEDICINE | Facility: CLINIC | Age: 67
End: 2017-09-29

## 2017-09-29 NOTE — TELEPHONE ENCOUNTER
Reason for Call:  Form, our goal is to have forms completed with 72 hours, however, some forms may require a visit or additional information.    Type of letter, form or note:  medical    Who is the form from?: Patient    Where did the form come from: Patient or family brought in       What clinic location was the form placed at?: [facility]    Where the form was placed: 's Box    What number is listed as a contact on the form?: [phone]       Additional comments: please fax to [phone], and then call to let patient know that it was completed.    Call taken on 9/29/2017 at 12:46 PM by Jacqueline Munguia

## 2017-10-03 NOTE — TELEPHONE ENCOUNTER
Faxed filled out form to [employer] [phone]. Called patient let her know this was done.  Danita Burns,

## 2017-10-18 ENCOUNTER — TELEPHONE (OUTPATIENT)
Dept: INTERNAL MEDICINE | Facility: CLINIC | Age: 67
End: 2017-10-18

## 2017-10-18 ASSESSMENT — PATIENT HEALTH QUESTIONNAIRE - PHQ9: SUM OF ALL RESPONSES TO PHQ QUESTIONS 1-9: 4

## 2017-10-18 NOTE — TELEPHONE ENCOUNTER
Patient on depression fail list and left message to call pink team back.     Micaela ROMERO CMA ([facility])

## 2017-10-18 NOTE — TELEPHONE ENCOUNTER
Called patient and completed phq9 with a score of 4.     Micaela ROMERO CMA ([facility])

## 2017-11-13 ENCOUNTER — TELEPHONE (OUTPATIENT)
Dept: FAMILY MEDICINE | Facility: CLINIC | Age: 67
End: 2017-11-13

## 2017-11-13 DIAGNOSIS — F33.1 MODERATE RECURRENT MAJOR DEPRESSION (H): ICD-10-CM

## 2017-11-13 NOTE — TELEPHONE ENCOUNTER
"Left message on voicemail for patient to return call to RN hotline at # [phone].    Which med is she referring to?  There was prozac in history that was taken off her list on 9/18/17 d/t \"stopped by patient  Is she still taking or wanting to go back on?  What dose is she on?    Michelle Rucker RN    "

## 2017-11-15 RX ORDER — FLUOXETINE 10 MG/1
10 CAPSULE ORAL DAILY
Qty: 30 CAPSULE | Refills: 5 | Status: SHIPPED | OUTPATIENT
Start: 2017-11-15 | End: 2017-11-24

## 2017-11-15 NOTE — TELEPHONE ENCOUNTER
Spoke with pt. She did not take the prozac for sometime since she felt she did not need it, but now with the change of season, she feels she needs it. She called the pharmacy and was able to get the script for Prozac as it had refills. She has been taking 10mg for the past 2 days. First day was fine, but now has a funny feeling in her head and a dizziness. Slight headache. She also has a cold. Advised to give it more time and take as prescribed. Pt does not like to take medication. Is concerned about taking the 10mg for 1 week then going up to 20mg.     Pt would like to know if ok to take 10mg and see if this helps, then increase to 20mg if needed? Please advise.    Ignacia Armstrong RN  [facility]

## 2017-11-15 NOTE — TELEPHONE ENCOUNTER
She can stay on prozac at 10 mg daily and please have her follow-up in clinic if symptoms not improving.  Refills sent to [facility] in [place].      Thanks,  Celeste Jacobs, CNP

## 2017-11-21 NOTE — PROGRESS NOTES
"  SUBJECTIVE:   Jessica Jordan is a 67 year old female who presents to clinic today for the following health issues:    Chief Complaint   Patient presents with     Depression     follow-up for meds. Due for DAP     Other     Discuss last mammogram       Depression Follow-up    Status since last visit: Worsened    See PHQ-9 for current symptoms.  Other associated symptoms: None    Complicating factors:   Significant life event:  No   Current substance abuse:  None  Anxiety or Panic symptoms:  Yes-  Possibly anxiety    PHQ-9 Score and MyChart F/U Questions 11/19/2015 5/15/2017 10/18/2017   Total Score 0 12 4   Q9: Suicide Ideation Not at all Not at all Not at all     In the past two weeks have you had thoughts of suicide or self-harm?  No.    Do you have concerns about your personal safety or the safety of others?   No    PHQ-9  English  PHQ-9   Any Language  Suicide Assessment Five-step Evaluation and Treatment (SAFE-T)    Patient recently resumed prozac.  She had wanted to try to address her depression with counseling, diet, exercise, but feels she needs better control at this time.  Her main complaint is anhedonia.  She has been off and on antidepressants for years.  She notes feeling \"glazed\" at times with starting prozac, but does not note it all the time.  She also notes that she increased her dose to 20 mg as instructed and had difficulty sleeping.  She denies any other side effects.    Patient wants to discuss dense breast tissue noted on 3D mammogram.  She also has noted breast soreness, starting before her mammogram, which was normal.  She has implants and wonders if she should touch base with her surgeon.        Problem list and histories reviewed & adjusted, as indicated.  Additional history: as documented    Patient Active Problem List   Diagnosis     Moderate recurrent major depression (H)     Vitamin D deficiency     Family history of ovarian cancer     Iron deficiency anemia due to chronic blood loss "     Osteopenia     Past Surgical History:   Procedure Laterality Date     APPENDECTOMY       BREAST SURGERY      Bilateral breast implants     HYSTERECTOMY, PAP NO LONGER INDICATED         Social History   Substance Use Topics     Smoking status: Never Smoker     Smokeless tobacco: Never Used     Alcohol use Yes      Comment: once a week     History reviewed. No pertinent family history.      Current Outpatient Prescriptions   Medication Sig Dispense Refill     multivitamin, therapeutic with minerals (MULTI-VITAMIN) TABS tablet Take 1 tablet by mouth daily       FLUoxetine (PROZAC) 10 MG capsule Take 2 capsules (20 mg) by mouth daily 180 capsule 1     cyanocolbalamin (VITAMIN  B-12) 500 MCG tablet Take 500 mcg by mouth daily       calcium carbonate (OS-FRANKI 500 MG [place]. [place]) 500 MG tablet Take 500 mg by mouth 2 times daily       Biotin 7500 MCG TABS        omega 3 1000 MG CAPS Take 2 g by mouth       Cholecalciferol (VITAMIN D3 PO) Take by mouth daily       [DISCONTINUED] FLUoxetine (PROZAC) 10 MG capsule Take 1 capsule (10 mg) by mouth daily For 1 week, then increase to 20 mg daily. 30 capsule 5     Allergies   Allergen Reactions     Codeine      Penicillins      Seasonal Allergies Other (See Comments)     Headache, runny nose     BP Readings from Last 3 Encounters:   11/24/17 114/62   09/18/17 112/82   05/15/17 120/72    Wt Readings from Last 3 Encounters:   11/24/17 138 lb 9.6 oz (62.9 kg)   09/18/17 136 lb 9.6 oz (62 kg)   05/15/17 138 lb 9.6 oz (62.9 kg)                  Labs reviewed in EPIC        Reviewed and updated as needed this visit by clinical staff     Reviewed and updated as needed this visit by Provider         ROS:  Constitutional, HEENT, cardiovascular, pulmonary, gi and gu systems are negative, except as otherwise noted.      OBJECTIVE:   /62 (BP Location: Left arm, Cuff Size: Adult Regular)  Pulse 68  Temp 97.8  F (36.6  C) (Oral)  Wt 138 lb 9.6 oz (62.9 kg)  LMP 10/27/1992  SpO2 100%   Breastfeeding? No  BMI 22.37 kg/m2  Body mass index is 22.37 kg/(m^2).  GENERAL: healthy, alert and no distress  RESP: lungs clear to auscultation - no rales, rhonchi or wheezes  CV: regular rate and rhythm, normal S1 S2, no S3 or S4, no murmur, click or rub, no peripheral edema and peripheral pulses strong  MS: no gross musculoskeletal defects noted, no edema  PSYCH: mentation appears normal and affect normal/bright    Diagnostic Test Results:  none     ASSESSMENT/PLAN:     1. Moderate recurrent major depression (H)  Patient to continue prozac at 20 mg daily and try taking in the am.  Hopefully side effects will improve with time and patient to contact clinic if they don't.  Patient advised it may take 4-6 weeks to see full effect of medication.  Patient verbalized understanding and will update via Cuedhart in 2 weeks.  - FLUoxetine (PROZAC) 10 MG capsule; Take 2 capsules (20 mg) by mouth daily  Dispense: 180 capsule; Refill: 1    2. Dense breast tissue  Patient's lifetime risk of developing breast cancer in 3.7%, given that, I would recommend continued screening with tomosynthesis.    3. Mastodynia  Patient to avoid caffeine, alcohol and will touch base with her plastic surgeon.      FUTURE APPOINTMENTS:       - Follow-up for annual visit or as needed    MAGAL[facility]

## 2017-11-24 ENCOUNTER — OFFICE VISIT (OUTPATIENT)
Dept: FAMILY MEDICINE | Facility: CLINIC | Age: 67
End: 2017-11-24
Payer: COMMERCIAL

## 2017-11-24 VITALS
HEART RATE: 68 BPM | SYSTOLIC BLOOD PRESSURE: 114 MMHG | WEIGHT: 138.6 LBS | OXYGEN SATURATION: 100 % | TEMPERATURE: 97.8 F | BODY MASS INDEX: 22.37 KG/M2 | DIASTOLIC BLOOD PRESSURE: 62 MMHG

## 2017-11-24 DIAGNOSIS — R92.30 DENSE BREAST TISSUE: ICD-10-CM

## 2017-11-24 DIAGNOSIS — F33.1 MODERATE RECURRENT MAJOR DEPRESSION (H): Primary | ICD-10-CM

## 2017-11-24 DIAGNOSIS — N64.4 MASTODYNIA: ICD-10-CM

## 2017-11-24 PROCEDURE — 99213 OFFICE O/P EST LOW 20 MIN: CPT | Performed by: NURSE PRACTITIONER

## 2017-11-24 RX ORDER — FLUOXETINE 10 MG/1
20 CAPSULE ORAL DAILY
Qty: 180 CAPSULE | Refills: 1 | Status: SHIPPED | OUTPATIENT
Start: 2017-11-24

## 2017-11-24 RX ORDER — MULTIPLE VITAMINS W/ MINERALS TAB 9MG-400MCG
1 TAB ORAL DAILY
COMMUNITY

## 2017-11-24 ASSESSMENT — PAIN SCALES - GENERAL: PAINLEVEL: NO PAIN (0)

## 2017-11-24 ASSESSMENT — ANXIETY QUESTIONNAIRES
1. FEELING NERVOUS, ANXIOUS, OR ON EDGE: SEVERAL DAYS
3. WORRYING TOO MUCH ABOUT DIFFERENT THINGS: SEVERAL DAYS
7. FEELING AFRAID AS IF SOMETHING AWFUL MIGHT HAPPEN: NOT AT ALL
GAD7 TOTAL SCORE: 3
2. NOT BEING ABLE TO STOP OR CONTROL WORRYING: SEVERAL DAYS
6. BECOMING EASILY ANNOYED OR IRRITABLE: NOT AT ALL
IF YOU CHECKED OFF ANY PROBLEMS ON THIS QUESTIONNAIRE, HOW DIFFICULT HAVE THESE PROBLEMS MADE IT FOR YOU TO DO YOUR WORK, TAKE CARE OF THINGS AT HOME, OR GET ALONG WITH OTHER PEOPLE: NOT DIFFICULT AT ALL
5. BEING SO RESTLESS THAT IT IS HARD TO SIT STILL: NOT AT ALL

## 2017-11-24 ASSESSMENT — PATIENT HEALTH QUESTIONNAIRE - PHQ9
SUM OF ALL RESPONSES TO PHQ QUESTIONS 1-9: 9
5. POOR APPETITE OR OVEREATING: NOT AT ALL

## 2017-11-24 NOTE — LETTER
My Depression Action Plan  Name: Jessica Jordan   Date of Birth 1950  Date: 11/24/2017    My doctor: Celeste Jacobs   My clinic: [place] [id number] [facility]  [place] [address]  [phone]          GREEN    ZONE   Good Control    What it looks like:     Things are going generally well. You have normal up s and down s. You may even feel depressed from time to time, but bad moods usually last less than a day.   What you need to do:  1. Continue to care for yourself (see self care plan)  2. Check your depression survival kit and update it as needed  3. Follow your physician s recommendations including any medication.  4. Do not stop taking medication unless you consult with your physician first.           YELLOW         ZONE Getting Worse    What it looks like:     Depression is starting to interfere with your life.     It may be hard to get out of bed; you may be starting to isolate yourself from others.    Symptoms of depression are starting to last most all day and this has happened for several days.     You may have suicidal thoughts but they are not constant.   What you need to do:     1. Call your care team, your response to treatment will improve if you keep your care team informed of your progress. Yellow periods are signs an adjustment may need to be made.     2. Continue your self-care, even if you have to fake it!    3. Talk to someone in your support network    4. Open up your depression survival kit           RED    ZONE Medical Alert - Get Help    What it looks like:     Depression is seriously interfering with your life.     You may experience these or other symptoms: You can t get out of bed most days, can t work or engage in other necessary activities, you have trouble taking care of basic hygiene, or basic responsibilities, thoughts of suicide or death that will not go away, self-injurious behavior.     What you need to do:  1. Call your care team  and request a same-day appointment. If they are not available (weekends or after hours) call your local crisis line, emergency room or 911.      Electronically signed by: Celeste Jacobs, November 24, 2017    Depression Self Care Plan / Survival Kit    Self-Care for Depression  Here s the deal. Your body and mind are really not as separate as most people think.  What you do and think affects how you feel and how you feel influences what you do and think. This means if you do things that people who feel good do, it will help you feel better.  Sometimes this is all it takes.  There is also a place for medication and therapy depending on how severe your depression is, so be sure to consult with your medical provider and/ or Behavioral Health Consultant if your symptoms are worsening or not improving.     In order to better manage my stress, I will:    Exercise  Get some form of exercise, every day. This will help reduce pain and release endorphins, the  feel good  chemicals in your brain. This is almost as good as taking antidepressants!  This is not the same as joining a gym and then never going! (they count on that by the way ) It can be as simple as just going for a walk or doing some gardening, anything that will get you moving.      Hygiene   Maintain good hygiene (Get out of bed in the morning, Make your bed, Brush your teeth, Take a shower, and Get dressed like you were going to work, even if you are unemployed).  If your clothes don't fit try to get ones that do.    Diet  I will strive to eat foods that are good for me, drink plenty of water, and avoid excessive sugar, caffeine, alcohol, and other mood-altering substances.  Some foods that are helpful in depression are: complex carbohydrates, B vitamins, flaxseed, fish or fish oil, fresh fruits and vegetables.    Psychotherapy  I agree to participate in Individual Therapy (if recommended).    Medication  If prescribed medications, I agree to take them.   Missing doses can result in serious side effects.  I understand that drinking alcohol, or other illicit drug use, may cause potential side effects.  I will not stop my medication abruptly without first discussing it with my provider.    Staying Connected With Others  I will stay in touch with my friends, family members, and my primary care provider/team.    Use your imagination  Be creative.  We all have a creative side; it doesn t matter if it s oil painting, sand castles, or mud pies! This will also kick up the endorphins.    Witness Beauty  (AKA stop and smell the roses) Take a look outside, even in mid-winter. Notice colors, textures. Watch the squirrels and birds.     Service to others  Be of service to others.  There is always someone else in need.  By helping others we can  get out of ourselves  and remember the really important things.  This also provides opportunities for practicing all the other parts of the program.    Humor  Laugh and be silly!  Adjust your TV habits for less news and crime-drama and more comedy.    Control your stress  Try breathing deep, massage therapy, biofeedback, and meditation. Find time to relax each day.     My support system    Clinic Contact:  Phone number:    Contact 1:  Phone number:    Contact 2:  Phone number:    [facility]/:  Phone number:    Therapist:  Phone number:    Local crisis center:    Phone number:    Other community support:  Phone number:

## 2017-11-24 NOTE — MR AVS SNAPSHOT
After Visit Summary   11/24/2017    Jessica Jordan    MRN: [id number]           Patient Information     Date Of Birth          1950        Visit Information        Provider Department      11/24/2017 9:40 AM Celeste Jacobs APRN [facility]        Today's Diagnoses     Moderate recurrent major depression (H)    -  1    Dense breast tissue        Mastodynia          Care Instructions    Update me on MyChart in 2 weeks about your symptoms.    [facility]    If you have any questions regarding to your visit please contact your care team:     Team Pink:   Clinic Hours Telephone Number   Internal Medicine:  Dr. Yodit Jacobs, NP       7am-7pm  Monday - Thursday   7am-5pm  Fridays  [phone]  (Appointment scheduling available 24/7)    Questions about your visit?  Team Line  [phone]   Urgent Care - [place] and [place] - 11am-9pm Monday-Friday Saturday-Sunday- 9am-5pm   [place] - 5pm-9pm Monday-Friday Saturday-Sunday- 9am-5pm  [phone] - [place]   [phone] - [place]       What options do I have for visits at the clinic other than the traditional office visit?  To expand how we care for you, many of our providers are utilizing electronic visits (e-visits) and telephone visits, when medically appropriate, for interactions with their patients rather than a visit in the clinic.   We also offer nurse visits for many medical concerns. Just like any other service, we will bill your insurance company for this type of visit based on time spent on the phone with your provider. Not all insurance companies cover these visits. Please check with your medical insurance if this type of visit is covered. You will be responsible for any charges that are not paid by your insurance.      E-visits via LivelyFeed:  generally incur a $35.00 fee.  Telephone visits:  Time spent on the phone: *charged based on time  that is spent on the phone in increments of 10 minutes. Estimated cost:   5-10 mins $30.00   11-20 mins. $59.00   21-30 mins. $85.00   Use Peakhart (secure email communication and access to your chart) to send your primary care provider a message or make an appointment. Ask someone on your Team how to sign up for Peakhart.    For a Price Quote for your services, please call our [employer]Bark Line at [phone].    As always, Thank you for trusting us with your health care needs!    Sun Harris, HORTENCIA          Follow-ups after your visit        Who to contact     If you have questions or need follow up information about today's clinic visit or your schedule please contact [facility] directly at [phone].  Normal or non-critical lab and imaging results will be communicated to you by Peakhart, letter or phone within 4 business days after the clinic has received the results. If you do not hear from us within 7 days, please contact the clinic through Peakhart or phone. If you have a critical or abnormal lab result, we will notify you by phone as soon as possible.  Submit refill requests through [employer] or call your pharmacy and they will forward the refill request to us. Please allow 3 business days for your refill to be completed.          Additional Information About Your Visit        [employer] Information     [employer] gives you secure access to your electronic health record. If you see a primary care provider, you can also send messages to your care team and make appointments. If you have questions, please call your primary care clinic.  If you do not have a primary care provider, please call [phone] and they will assist you.        Care EveryWhere ID     This is your Care EveryWhere ID. This could be used by other organizations to access your [place] medical records  [id number]        Your Vitals Were     Pulse Temperature Last Period Pulse Oximetry Breastfeeding? BMI (Body Mass Index)    68 97.8   F (36.6  C) (Oral) 10/27/1992 100% No 22.37 kg/m2       Blood Pressure from Last 3 Encounters:   11/24/17 114/62   09/18/17 112/82   05/15/17 120/72    Weight from Last 3 Encounters:   11/24/17 138 lb 9.6 oz (62.9 kg)   09/18/17 136 lb 9.6 oz (62 kg)   05/15/17 138 lb 9.6 oz (62.9 kg)              Today, you had the following     No orders found for display         Today's Medication Changes          These changes are accurate as of: 11/24/17 10:34 AM.  If you have any questions, ask your nurse or doctor.               These medicines have changed or have updated prescriptions.        Dose/Directions    FLUoxetine 10 MG capsule   Commonly known as:  PROzac   This may have changed:    - how much to take  - additional instructions   Used for:  Moderate recurrent major depression (H)   Changed by:  Celeste Jacobs APRN CNP        Dose:  20 mg   Take 2 capsules (20 mg) by mouth daily   Quantity:  180 capsule   Refills:  1            Where to get your medicines      These medications were sent to [facility] [address]  [address] [facility] [address]     Phone:  [phone]     FLUoxetine 10 MG capsule                Primary Care Provider Office Phone # Fax #    [place] [phone] [address] [facility] [address]        Equal Access to Services     HAYDER FINNEY AH: Hadii sherry ku hadasho Soomaali, waaxda luqadaha, qaybta kaalmada adeegyada, waxay mallorie donaldson aderandi ha. So [facility] [phone].    ATENCIÓN: Si habla español, tiene a mustafa disposición servicios gratuitos de asistencia lingüística. Llfior al [phone].    We comply with applicable federal civil rights laws and [place] laws. We do not discriminate on the basis of race, color, national origin, age, disability, sex, sexual orientation, or gender identity.            Thank you!     Thank you for choosing [facility]  for  your care. Our goal is always to provide you with excellent care. Hearing back from our patients is one way we can continue to improve our services. Please take a few minutes to complete the written survey that you may receive in the mail after your visit with us. Thank you!             Your Updated Medication List - Protect others around you: Learn how to safely use, store and throw away your medicines at www.disposemymeds.org.          This list is accurate as of: 11/24/17 10:34 AM.  Always use your most recent med list.                   Brand Name Dispense Instructions for use Diagnosis    Biotin 7500 MCG Tabs           calcium carbonate 1250 MG tablet    OS-FRANKI 500 mg [place]. [place]     Take 500 mg by mouth 2 times daily        cyanocolbalamin 500 MCG tablet    vitamin  B-12     Take 500 mcg by mouth daily        FLUoxetine 10 MG capsule    PROzac    180 capsule    Take 2 capsules (20 mg) by mouth daily    Moderate recurrent major depression (H)       Multi-vitamin Tabs tablet      Take 1 tablet by mouth daily        omega 3 1000 MG Caps      Take 2 g by mouth        VITAMIN D3 PO      Take by mouth daily

## 2017-11-24 NOTE — PATIENT INSTRUCTIONS
Update me on Enevatehart in 2 weeks about your symptoms.    [facility]    If you have any questions regarding to your visit please contact your care team:     Team Pink:   Clinic Hours Telephone Number   Internal Medicine:  Dr. Yodit Jacobs, NP       7am-7pm  Monday - Thursday   7am-5pm  Fridays  [phone]  (Appointment scheduling available 24/7)    Questions about your visit?  Team Line  [phone]   Urgent Care - [place] - 11am-9pm Monday-Friday Saturday-Sunday- 9am-5pm   [place] - 5pm-9pm Monday-Friday Saturday-Sunday- 9am-5pm  [phone] - [place]   [phone] - [place]       What options do I have for visits at the clinic other than the traditional office visit?  To expand how we care for you, many of our providers are utilizing electronic visits (e-visits) and telephone visits, when medically appropriate, for interactions with their patients rather than a visit in the clinic.   We also offer nurse visits for many medical concerns. Just like any other service, we will bill your insurance company for this type of visit based on time spent on the phone with your provider. Not all insurance companies cover these visits. Please check with your medical insurance if this type of visit is covered. You will be responsible for any charges that are not paid by your insurance.      E-visits via Ezeecube:  generally incur a $35.00 fee.  Telephone visits:  Time spent on the phone: *charged based on time that is spent on the phone in increments of 10 minutes. Estimated cost:   5-10 mins $30.00   11-20 mins. $59.00   21-30 mins. $85.00   Use Enevatehart (secure email communication and access to your chart) to send your primary care provider a message or make an appointment. Ask someone on your Team how to sign up for Ezeecube.    For a Price Quote for your services, please call our Consumer Price Line at [phone].    As always, Thank  you for trusting us with your health care needs!    Sun Harris, CMA

## 2017-11-24 NOTE — NURSING NOTE
"Chief Complaint   Patient presents with     Depression     follow-up for meds. Due for DAP     Other     Discuss last mammogram       Initial /62 (BP Location: Left arm, Cuff Size: Adult Regular)  Pulse 68  Temp 97.8  F (36.6  C) (Oral)  Wt 138 lb 9.6 oz (62.9 kg)  LMP 10/27/1992  SpO2 100%  Breastfeeding? No  BMI 22.37 kg/m2 Estimated body mass index is 22.37 kg/(m^2) as calculated from the following:    Height as of 3/6/17: 5' 6\" (1.676 m).    Weight as of this encounter: 138 lb 9.6 oz (62.9 kg).  Medication Reconciliation: complete       Sun Harris, CMA      "

## 2017-11-25 ASSESSMENT — ANXIETY QUESTIONNAIRES: GAD7 TOTAL SCORE: 3

## 2017-12-13 ENCOUNTER — TRANSFERRED RECORDS (OUTPATIENT)
Dept: HEALTH INFORMATION MANAGEMENT | Facility: CLINIC | Age: 67
End: 2017-12-13

## 2017-12-13 LAB — HEMOCCULT STL QL IA: NEGATIVE

## 2018-01-02 ENCOUNTER — THERAPY VISIT (OUTPATIENT)
Dept: CHIROPRACTIC MEDICINE | Facility: CLINIC | Age: 68
End: 2018-01-02
Payer: COMMERCIAL

## 2018-01-02 DIAGNOSIS — M99.02 THORACIC SEGMENT DYSFUNCTION: ICD-10-CM

## 2018-01-02 DIAGNOSIS — M54.2 CERVICALGIA: ICD-10-CM

## 2018-01-02 DIAGNOSIS — M62.838 SPASM OF MUSCLE: ICD-10-CM

## 2018-01-02 DIAGNOSIS — M99.01 CERVICAL SEGMENT DYSFUNCTION: Primary | ICD-10-CM

## 2018-01-02 PROCEDURE — 98940 CHIROPRACT MANJ 1-2 REGIONS: CPT | Mod: AT | Performed by: CHIROPRACTOR

## 2018-01-02 NOTE — PROGRESS NOTES
Visit #:  9, based on treatment plan    Subjective:  Jessica Jordan is a 67 year old female who is seen in f/u up for:     Spasm of muscle  Thoracic segment dysfunction  Cervicalgia  Cervical segment dysfunction.     Since last visit on 9/28/2017,  Jessica Jordan reports the following changes: Pain immediately after last treatment: 0/10 and their pain level today 8/10 neck and left upper trap areas.  Jessica reports that her low back and hips are still feeling good. Overall she is feeling improved.    Area of chief complaint:  Cervical:  Symptoms are graded at 8/10. The quality is described as achey.  Motion has increased and was near normal. Patient feels that they are improved due to a reduction in symptoms.    Neck Disability Index score: 20.         Objective:  The following was observed:    P: palpatory tenderness, upper traps    A: static palpation demonstrates intersegmental asymmetry , pelvis, lumbar, thoracic and cervical spine     R: motion palpation notes restricted motion, :  C2 left rotation  C6 Right rotation restricted  C7 Right rotation restricted  T1 Right rotation restricted  T2 Right rotation restricted      T: hypertonicity at:  Traps R>>L      Assessment:    Segmental spinal dysfunction/restrictions found at:  C6   C7   T1   T2       Diagnoses:      1. Segmental dysfunction of cervical region    2. Cervicalgia   3. Segmental dysfunction of thoracicregion    4. Spasm of muscle                    Patient's condition:  Patient had restrictions pre-manipulation    Treatment effectiveness:  Post manipulation there is better intersegmental movement and Patient claims to feel looser post manipulation      Procedures:  CMT:  92850 Chiropractic manipulative treatment 1-2 regions performed   Cervical: Activator, C6, C7 , Prone  Thoracic: Activator, T1, T2,  Prone      Modalities:  None performed this visit    Therapeutic procedures:  None    Prognosis: Good    Progress towards Goals: Patient is making  progress towards the goal     Response to Treatment:   Reduction in symptoms as reported by patient      Recommendations:    Instructions: ice 20 minutes every other hour as needed and stretch as instructed at visit    Follow-up:  Return to care in 2 days

## 2018-01-04 ENCOUNTER — THERAPY VISIT (OUTPATIENT)
Dept: CHIROPRACTIC MEDICINE | Facility: CLINIC | Age: 68
End: 2018-01-04
Payer: COMMERCIAL

## 2018-01-04 DIAGNOSIS — M54.2 CERVICALGIA: ICD-10-CM

## 2018-01-04 DIAGNOSIS — M99.02 THORACIC SEGMENT DYSFUNCTION: ICD-10-CM

## 2018-01-04 DIAGNOSIS — M99.01 CERVICAL SEGMENT DYSFUNCTION: Primary | ICD-10-CM

## 2018-01-04 DIAGNOSIS — M62.838 SPASM OF MUSCLE: ICD-10-CM

## 2018-01-04 PROCEDURE — 98940 CHIROPRACT MANJ 1-2 REGIONS: CPT | Mod: AT | Performed by: CHIROPRACTOR

## 2018-01-04 NOTE — PROGRESS NOTES
Visit #: 10 , 2 based on zena treatment plan    Subjective:  Jessica Jordan is a 67 year old female who is seen in f/u up for:     Spasm of muscle  Thoracic segment dysfunction  Cervicalgia  Cervical segment dysfunction.     Since last visit on 1/2/2018,  Jessica Jordan reports the following changes: Pain immediately after last treatment: 0/10 and their pain level today 8/10 neck and left upper trap areas.  Jessica reports that her low back and hips are still feeling better.  Her neck was feeling better until yesterday when she had to help bath a patient and afterwards her neck was very stiff and sore.    Area of chief complaint:  Cervical:  Symptoms are graded at 8/10. The quality is described as achey.  Motion has increased and was near normal. Patient feels that they are improved due to a reduction in symptoms.    Neck Disability Index score: 20.         Objective:  The following was observed:    P: palpatory tenderness, upper traps    A: static palpation demonstrates intersegmental asymmetry , pelvis, lumbar, thoracic and cervical spine     R: motion palpation notes restricted motion, :  C2 left rotation  C6 Right rotation restricted  C7 Right rotation restricted  T1 Right rotation restricted  T2 Right rotation restricted      T: hypertonicity at:  Traps R>>L      Assessment:    Segmental spinal dysfunction/restrictions found at:  C6   C7   T1   T2       Diagnoses:      1. Segmental dysfunction of cervical region    2. Cervicalgia   3. Segmental dysfunction of thoracicregion    4. Spasm of muscle                    Patient's condition:  Patient had restrictions pre-manipulation    Treatment effectiveness:  Post manipulation there is better intersegmental movement and Patient claims to feel looser post manipulation      Procedures:  CMT:  31601 Chiropractic manipulative treatment 1-2 regions performed   Cervical: Activator, C6, C7 , Prone  Thoracic: Activator, T1, T2,  Prone      Modalities:  56850: Heat:    For 5 min to Traps  24608: MSTM:  To Traps  for 5 min      Therapeutic procedures:  None    Prognosis: Good    Progress towards Goals: Patient is making progress towards the goal     Response to Treatment:   Reduction in symptoms as reported by patient      Recommendations:    Instructions: ice 20 minutes every other hour as needed and stretch as instructed at visit    Follow-up:  Return to care in 5 days

## 2018-01-10 ENCOUNTER — THERAPY VISIT (OUTPATIENT)
Dept: CHIROPRACTIC MEDICINE | Facility: CLINIC | Age: 68
End: 2018-01-10
Payer: COMMERCIAL

## 2018-01-10 DIAGNOSIS — M54.2 CERVICALGIA: ICD-10-CM

## 2018-01-10 DIAGNOSIS — M99.02 THORACIC SEGMENT DYSFUNCTION: ICD-10-CM

## 2018-01-10 DIAGNOSIS — M62.838 SPASM OF MUSCLE: ICD-10-CM

## 2018-01-10 DIAGNOSIS — M99.01 CERVICAL SEGMENT DYSFUNCTION: Primary | ICD-10-CM

## 2018-01-10 PROCEDURE — 98940 CHIROPRACT MANJ 1-2 REGIONS: CPT | Mod: AT | Performed by: CHIROPRACTOR

## 2018-01-10 NOTE — PROGRESS NOTES
Visit #: 11 , 3 based on zena treatment plan    Subjective:  Jessica Jordan is a 67 year old female who is seen in f/u up for:     Spasm of muscle  Thoracic segment dysfunction  Cervicalgia  Cervical segment dysfunction.     Since last visit on 1/4/2018,  Jessica Jordan reports the following changes:     Pain immediately after last treatment: 0/10 and their pain level today 3/10 neck and left upper trap areas. She reports stretches have helped a lot.    Area of chief complaint:  Cervical:  Symptoms are graded at 8/10. The quality is described as achey.  Motion has increased and was near normal. Patient feels that they are improved due to a reduction in symptoms.    Neck Disability Index score: 20.         Objective:  The following was observed:    P: palpatory tenderness, upper traps    A: static palpation demonstrates intersegmental asymmetry , pelvis, lumbar, thoracic and cervical spine     R: motion palpation notes restricted motion, :  C2 left rotation  C6 Right rotation restricted  C7 Right rotation restricted  T1 Right rotation restricted  T2 Right rotation restricted      T: hypertonicity at:  Traps R>>L      Assessment:    Segmental spinal dysfunction/restrictions found at:  C6   C7   T1   T2       Diagnoses:      1. Segmental dysfunction of cervical region    2. Cervicalgia   3. Segmental dysfunction of thoracicregion    4. Spasm of muscle                    Patient's condition:  Patient had restrictions pre-manipulation    Treatment effectiveness:  Post manipulation there is better intersegmental movement and Patient claims to feel looser post manipulation      Procedures:  CMT:  58768 Chiropractic manipulative treatment 1-2 regions performed   Cervical: Activator, C6, C7 , Prone  Thoracic: Activator, T1, T2,  Prone      Modalities:  42157: MSTM:  To Traps  for 5 min      Therapeutic procedures:  None    Prognosis: Good    Progress towards Goals: Patient is making progress towards the goal      Response to Treatment:   Reduction in symptoms as reported by patient      Recommendations:    Instructions: ice 20 minutes every other hour as needed and stretch as instructed at visit    Follow-up:  Return to care in a week

## 2018-01-22 ENCOUNTER — OFFICE VISIT (OUTPATIENT)
Dept: ORTHOPEDICS | Facility: CLINIC | Age: 68
End: 2018-01-22
Payer: COMMERCIAL

## 2018-01-22 VITALS — SYSTOLIC BLOOD PRESSURE: 122 MMHG | DIASTOLIC BLOOD PRESSURE: 78 MMHG | RESPIRATION RATE: 16 BRPM | HEART RATE: 72 BPM

## 2018-01-22 DIAGNOSIS — M47.812 SPONDYLOSIS OF CERVICAL REGION WITHOUT MYELOPATHY OR RADICULOPATHY: ICD-10-CM

## 2018-01-22 DIAGNOSIS — M54.2 CERVICALGIA: Primary | ICD-10-CM

## 2018-01-22 PROCEDURE — 99204 OFFICE O/P NEW MOD 45 MIN: CPT | Performed by: PEDIATRICS

## 2018-01-22 RX ORDER — METHYLPREDNISOLONE 4 MG
TABLET, DOSE PACK ORAL
Qty: 21 TABLET | Refills: 0 | Status: SHIPPED | OUTPATIENT
Start: 2018-01-22 | End: 2018-02-16

## 2018-01-22 NOTE — MR AVS SNAPSHOT
After Visit Summary   1/22/2018    Jessica Jordan    MRN: [id number]           Patient Information     Date Of Birth          1950        Visit Information        Provider Department      1/22/2018 9:00 AM Amrit Cowan,  [facility]        Today's Diagnoses     Cervicalgia    -  1      Care Instructions    Begin Physical Therapy     Begin Oral Steroid (Medrol Dose Pack)     Follow up 3-4 weeks if not improving, or sooner if any concerning changes          Follow-ups after your visit        Additional Services     [facility] PT, HAND, AND CHIROPRACTIC REFERRAL       **This order will print in the [facility] Scheduling Office**    Physical Therapy, Hand Therapy and Chiropractic Care are available through:    *[facility]  *[facility]  *[facility]    Call one number to schedule at any of the above locations: [phone].    Your provider has referred you to: Physical Therapy at [facility] or [facility]    Indication/Reason for Referral: Neck Pain  Onset of Illness:   Therapy Orders: Evaluate and Treat  Special Programs: None  Special Request: None    Doris George      Additional Comments for the Therapist or Chiropractor:       Please be aware that coverage of these services is subject to the terms and limitations of your health insurance plan.  Call member services at your health plan with any benefit or coverage questions.      Please bring the following to your appointment:    *Your personal calendar for scheduling future appointments  *Comfortable clothing                  Who to contact     If you have questions or need follow up information about today's clinic visit or your schedule please contact [facility] directly at [phone].  Normal or non-critical lab and imaging results will be communicated to you by MyChart, letter or phone within 4 business days after the clinic has received  the results. If you do not hear from us within 7 days, please contact the clinic through JiaThis or phone. If you have a critical or abnormal lab result, we will notify you by phone as soon as possible.  Submit refill requests through [facility]iaThis or call your pharmacy and they will forward the refill request to us. Please allow 3 business days for your refill to be completed.          Additional Information About Your Visit        JiaThis Information     JiaThis gives you secure access to your electronic health record. If you see a primary care provider, you can also send messages to your care team and make appointments. If you have questions, please call your primary care clinic.  If you do not have a primary care provider, please call [phone] and they will assist you.        Care EveryWhere ID     This is your Care EveryWhere ID. This could be used by other organizations to access your [facility] medical records  [id number]        Your Vitals Were     Pulse Respirations Last Period             72 16 10/27/1992          Blood Pressure from Last 3 Encounters:   01/22/18 122/78   11/24/17 114/62   09/18/17 112/82    Weight from Last 3 Encounters:   11/24/17 138 lb 9.6 oz (62.9 kg)   09/18/17 136 lb 9.6 oz (62 kg)   05/15/17 138 lb 9.6 oz (62.9 kg)              We Performed the Following     [facility] PT, HAND, AND CHIROPRACTIC REFERRAL          Today's Medication Changes          These changes are accurate as of: 1/22/18  9:53 AM.  If you have any questions, ask your nurse or doctor.               Start taking these medicines.        Dose/Directions    methylPREDNISolone 4 MG tablet   Commonly known as:  MEDROL DOSEPAK   Used for:  Cervicalgia   Started by:  Amrit Cowan, DO        Follow package instructions   Quantity:  21 tablet   Refills:  0            Where to get your medicines      These medications were sent to [facility] - [facility] - [address]  [address],  [place] [address]     Phone:  [phone]     methylPREDNISolone 4 MG tablet                Primary Care Provider Office Phone # Fax #    MAG[place] [facility] [phone] [address]  [place] [address]        Equal Access to Services     [facility] : Hadii aad ku hadasho Soomaali, waaxda luqadaha, qaybta kaalmada adeegyada, waxay idiin haydanieln adeeg addis labakarin . So [facility] [phone].    ATENCIÓN: Si habla español, tiene a mustafa disposición servicios gratuitos de asistencia lingüística. Niles al [phone].    We comply with applicable federal civil rights laws and [place] laws. We do not discriminate on the basis of race, color, national origin, age, disability, sex, sexual orientation, or gender identity.            Thank you!     Thank you for choosing [facility]  for your care. Our goal is always to provide you with excellent care. Hearing back from our patients is one way we can continue to improve our services. Please take a few minutes to complete the written survey that you may receive in the mail after your visit with us. Thank you!             Your Updated Medication List - Protect others around you: Learn how to safely use, store and throw away your medicines at www.disposemymeds.org.          This list is accurate as of: 1/22/18  9:53 AM.  Always use your most recent med list.                   Brand Name Dispense Instructions for use Diagnosis    Biotin 7500 MCG Tabs           calcium carbonate 1250 MG tablet    OS-FRANKI 500 mg [place]. Ca     Take 500 mg by mouth 2 times daily        cyanocolbalamin 500 MCG tablet    vitamin  B-12     Take 500 mcg by mouth daily        FLUoxetine 10 MG capsule    PROzac    180 capsule    Take 2 capsules (20 mg) by mouth daily    Moderate recurrent major depression (H)       methylPREDNISolone 4 MG tablet    MEDROL DOSEPAK    21 tablet    Follow package instructions    Cervicalgia        Multi-vitamin Tabs tablet      Take 1 tablet by mouth daily        omega 3 1000 MG Caps      Take 2 g by mouth        VITAMIN D3 PO      Take by mouth daily

## 2018-01-22 NOTE — LETTER
1/22/2018         RE: Jessica Jordan  [address]  [place] [facility] [address]        Dear Colleague,    Thank you for referring your patient, Jessica Jordan, to the [facility]. Please see a copy of my visit note below.    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  Patient has been seeing Dr. Munoz for her neck for a few months due to neck pain following lifting weights.  This weekend she had an increase in pain with no known cause.  Pain down her arm to mid biceps with some tingling this morning.   Pain increases with doing her hair   X rays done just under 1 year ago.      **  Patient was working out 5 months ago (showns butterfly position) and over did it with pain in the left side cervical spine area including paraspinals, onset.    Does have tingling in the left upper trap and shoulder area going down to the upper arm. Has had the the past, but more so this weekend.     Injury: ongoing     Location of Pain: left sided neck   Duration of Pain: 8+ month(s) with 2 days of increase in pain   Rating of Pain at worst: 10/10  Rating of Pain Currently: 8/10  Symptoms are better with: Nothing  Symptoms are worse with: movement, overhead motions   Additional Features:   Positive: paresthesias   Negative: swelling, bruising, popping, grinding, catching, locking, instability, numbness, weakness, pain in other joints and systemic symptoms  Other evaluation and/or treatments so far consists of: chiro, ice   Prior History of related problems: denies     Social History: works in a group home, caretaker    Review of Systems  Musculoskeletal: as above  Remainder of review of systems is negative including constitutional, CV, pulmonary, GI, Skin and Neurologic except as noted in HPI or medical history.    This  document serves as a record of the services and decisions personally performed and made by Amrit Cowan DO, CAQ. It was created on his behalf by Yovani Corcoran, a trained medical scribe. The creation of this document is based the provider's statements to the medical scribe.  Yovani Corcoran January 22, 2018 9:24 AM       Past Medical History:   Diagnosis Date     Depressive disorder      Past Surgical History:   Procedure Laterality Date     APPENDECTOMY       BREAST SURGERY      Bilateral breast implants     HYSTERECTOMY, PAP NO LONGER INDICATED       Fam hx: noncontributory    Social History     Social History     Marital status: Single     Spouse name: N/A     Number of children: N/A     Years of education: N/A     Occupational History     Not on file.     Social History Main Topics     Smoking status: Never Smoker     Smokeless tobacco: Never Used     Alcohol use Yes      Comment: once a week     Drug use: No     Sexual activity: No     Other Topics Concern     Parent/Sibling W/ Cabg, Mi Or Angioplasty Before 65f 55m? No     Social History Narrative       Objective  /78  Pulse 72  Resp 16  LMP 10/27/1992    GENERAL APPEARANCE: healthy, alert and mild distress   GAIT: NORMAL  SKIN: no suspicious lesions or rashes  NEURO: Normal strength and tone, mentation intact and speech normal  PSYCH:  mentation appears normal and affect normal/bright  HEENT: no scleral icterus  CV: no extremity edema  RESP: nonlabored breathing      Shoulder exam:  Left  Range of Motion: Forward flexion:       Abduction:       Internal rotation:      External rotation:    Grossly symmetric  Inspection:  No visible deformity noted.  Strength: Abduction (arm at side)     Internal rotation (arm at side)     External rotation (arm at side)      Adduction    Grossly symmetric  Skin: No visible abnormalities  Lymphatics: No edema  Sensation:  Normal sensation over shoulder and upper extremity      Cervical Spine Exam    Range of  Motion:       forward flexion full, no pain          extension mild limitation, increased pain left diffuse         lateral rotation limited to the left, freer to the right, pain with both motions         lateral flexion moderate limitation to the right, freer motion to the left     Inspection:      Some loss of lordosis    Tender:      paraspinal muscles on the left     Non-Tender:      remainder of cervical spine area    Strength:     C4 (shoulder shrug)  symmetric 5/5       C5 (shoulder abduction) symmetric 5/5       C6 (elbow flexion) symmetric 5/5       C7 (elbow extension) symmetric 5/5       C8 (finger abduction, thumb flexion) symmetric 5/5    Reflexes:      C5 (biceps) symmetric normal       C6 (supinator) symmetric normal       C7 (triceps) symmetric normal    Sensation:     grossly intact througout bilateral upper extremities    Special Tests:     Spurling sharp pain with left motion (unable to hold position), pain with right motion    No radicular symptoms with Spurling    Skin:     well perfused       capillary refill brisk    Lymphatics:      no edema noted in the upper extremities       Radiology  Visualized radiographs of cervical spine from 3/6/2017, and reviewed the images with the patient.  Impression: multilevel degenerative change.    XR CERVICAL SPINE 2/3 VWS 3/6/2017 11:47 AM     HISTORY: Neck pain.     COMPARISON: None.     FINDINGS: Grade 1 retrolisthesis of C4. Moderate loss of disc space at  C4-C5-C6-C7. Marginal osteophyte formation at C4 and C5. Disc space at  C2-C3-C4 is preserved. Prevertebral soft tissues are normal.         IMPRESSION: Mild low cervical degenerative change, principally  characterized by loss of disc space.     CARLY MOORE MD    Assessment:  1. Cervicalgia    2. Spondylosis of cervical region without myelopathy or radiculopathy    cervical DDD and facet arthrosis. Likely more facet currently.      Plan:  Discussed the assessment with the patient.    Plain films of  the area reviewed with the patient.    Discussed:  *Symptom Treatment - Ice, Heat, OTCs   *Activity Modification   *Imaging - MRI of the cervical spine, no new X-Ray needed today  *Medication - oral steroid, Muscle relaxant   *Chiropractic care - as desired   *Injection - would require an MRI   *Rehab - Home exercises vs Formal PT      Topical Treatments: Ice preferred or Heat prn   Over the counter medication: Patient's preferred OTC medication as directed on packaging.  Prescription Medication as directed: Medrol Dose Pack   Discussed potential benefit from oral steroid, she desired to try. rx placed.   Pertinent potential adverse effect profile of prescribed medication(s) was discussed with the patient, who expressed understanding.   Does not desire trial muscle relaxant.  Activity Modification: as discussed  Discussed consideration of imaging. Given no radicular component, hold with MRI. No new x-ray required either without new injury.  Rehab: Physical Therapy: [facility] - [phone]; next step  Consider MRI if radicular symptoms persistent.   Follow up: in 3-4 weeks if not improving; otherwise, as needed   We discussed potentially concerning signs and symptoms related to the condition(s) listed above, including increase in pain, changing pain, radicular symptoms, numbness and tingling, weakness and the patient was instructed to seek appropriate medical care if noted. All questions answered to patient's satisfaction. The patient expressed understanding of the plan.       Amrit Cowan DO, CAQ    CC: Darrel Munoz      Disclaimer: This note consists of symbols derived from keyboarding, dictation and/or voice recognition software. As a result, there may be errors in the script that have gone undetected. Please consider this when interpreting information found in this chart.    The information in this document, created by the medical scribe for me, accurately reflects the  services I personally performed and the decisions made by me. I have reviewed and approved this document for accuracy.   Amrit Cowan DO, CAAFSHIN      Again, thank you for allowing me to participate in the care of your patient.        Sincerely,        Amrit Cowan DO

## 2018-01-22 NOTE — PROGRESS NOTES
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  Patient has been seeing Dr. Munoz for her neck for a few months due to neck pain following lifting weights.  This weekend she had an increase in pain with no known cause.  Pain down her arm to mid biceps with some tingling this morning.   Pain increases with doing her hair   X rays done just under 1 year ago.      **  Patient was working out 5 months ago (showns butterfly position) and over did it with pain in the left side cervical spine area including paraspinals, onset.    Does have tingling in the left upper trap and shoulder area going down to the upper arm. Has had the the past, but more so this weekend.     Injury: ongoing     Location of Pain: left sided neck   Duration of Pain: 8+ month(s) with 2 days of increase in pain   Rating of Pain at worst: 10/10  Rating of Pain Currently: 8/10  Symptoms are better with: Nothing  Symptoms are worse with: movement, overhead motions   Additional Features:   Positive: paresthesias   Negative: swelling, bruising, popping, grinding, catching, locking, instability, numbness, weakness, pain in other joints and systemic symptoms  Other evaluation and/or treatments so far consists of: chiro, ice   Prior History of related problems: denies     Social History: works in a group home, caretaker    Review of Systems  Musculoskeletal: as above  Remainder of review of systems is negative including constitutional, CV, pulmonary, GI, Skin and Neurologic except as noted in HPI or medical history.    This document serves as a record of the services and decisions personally performed and made by Amrit Cowan DO, CAQ. It was created on his behalf by Yovani Corcoran, a trained medical scribe. The creation of this document is based the provider's statements to the  medical scribe.  Yovani Corcoran January 22, 2018 9:24 AM       Past Medical History:   Diagnosis Date     Depressive disorder      Past Surgical History:   Procedure Laterality Date     APPENDECTOMY       BREAST SURGERY      Bilateral breast implants     HYSTERECTOMY, PAP NO LONGER INDICATED       Fam hx: noncontributory    Social History     Social History     Marital status: Single     Spouse name: N/A     Number of children: N/A     Years of education: N/A     Occupational History     Not on file.     Social History Main Topics     Smoking status: Never Smoker     Smokeless tobacco: Never Used     Alcohol use Yes      Comment: once a week     Drug use: No     Sexual activity: No     Other Topics Concern     Parent/Sibling W/ Cabg, Mi Or Angioplasty Before 65f 55m? No     Social History Narrative       Objective  /78  Pulse 72  Resp 16  LMP 10/27/1992    GENERAL APPEARANCE: healthy, alert and mild distress   GAIT: NORMAL  SKIN: no suspicious lesions or rashes  NEURO: Normal strength and tone, mentation intact and speech normal  PSYCH:  mentation appears normal and affect normal/bright  HEENT: no scleral icterus  CV: no extremity edema  RESP: nonlabored breathing      Shoulder exam:  Left  Range of Motion: Forward flexion:       Abduction:       Internal rotation:      External rotation:    Grossly symmetric  Inspection:  No visible deformity noted.  Strength: Abduction (arm at side)     Internal rotation (arm at side)     External rotation (arm at side)      Adduction    Grossly symmetric  Skin: No visible abnormalities  Lymphatics: No edema  Sensation:  Normal sensation over shoulder and upper extremity      Cervical Spine Exam    Range of Motion:       forward flexion full, no pain          extension mild limitation, increased pain left diffuse         lateral rotation limited to the left, freer to the right, pain with both motions         lateral flexion moderate limitation to the right, freer  motion to the left     Inspection:      Some loss of lordosis    Tender:      paraspinal muscles on the left     Non-Tender:      remainder of cervical spine area    Strength:     C4 (shoulder shrug)  symmetric 5/5       C5 (shoulder abduction) symmetric 5/5       C6 (elbow flexion) symmetric 5/5       C7 (elbow extension) symmetric 5/5       C8 (finger abduction, thumb flexion) symmetric 5/5    Reflexes:      C5 (biceps) symmetric normal       C6 (supinator) symmetric normal       C7 (triceps) symmetric normal    Sensation:     grossly intact througout bilateral upper extremities    Special Tests:     Spurling sharp pain with left motion (unable to hold position), pain with right motion    No radicular symptoms with Spurling    Skin:     well perfused       capillary refill brisk    Lymphatics:      no edema noted in the upper extremities       Radiology  Visualized radiographs of cervical spine from 3/6/2017, and reviewed the images with the patient.  Impression: multilevel degenerative change.    XR CERVICAL SPINE 2/3 VWS 3/6/2017 11:47 AM     HISTORY: Neck pain.     COMPARISON: None.     FINDINGS: Grade 1 retrolisthesis of C4. Moderate loss of disc space at  C4-C5-C6-C7. Marginal osteophyte formation at C4 and C5. Disc space at  C2-C3-C4 is preserved. Prevertebral soft tissues are normal.         IMPRESSION: Mild low cervical degenerative change, principally  characterized by loss of disc space.     CARLY MOORE MD    Assessment:  1. Cervicalgia    2. Spondylosis of cervical region without myelopathy or radiculopathy    cervical DDD and facet arthrosis. Likely more facet currently.      Plan:  Discussed the assessment with the patient.    Plain films of the area reviewed with the patient.    Discussed:  *Symptom Treatment - Ice, Heat, OTCs   *Activity Modification   *Imaging - MRI of the cervical spine, no new X-Ray needed today  *Medication - oral steroid, Muscle relaxant   *Chiropractic care - as desired    *Injection - would require an MRI   *Rehab - Home exercises vs Formal PT      Topical Treatments: Ice preferred or Heat prn   Over the counter medication: Patient's preferred OTC medication as directed on packaging.  Prescription Medication as directed: Medrol Dose Pack   Discussed potential benefit from oral steroid, she desired to try. rx placed.   Pertinent potential adverse effect profile of prescribed medication(s) was discussed with the patient, who expressed understanding.   Does not desire trial muscle relaxant.  Activity Modification: as discussed  Discussed consideration of imaging. Given no radicular component, hold with MRI. No new x-ray required either without new injury.  Rehab: Physical Therapy: [facility] - [phone]; next step  Consider MRI if radicular symptoms persistent.   Follow up: in 3-4 weeks if not improving; otherwise, as needed   We discussed potentially concerning signs and symptoms related to the condition(s) listed above, including increase in pain, changing pain, radicular symptoms, numbness and tingling, weakness and the patient was instructed to seek appropriate medical care if noted. All questions answered to patient's satisfaction. The patient expressed understanding of the plan.       Amrit Cowan DO, CAQ    CC: Darrel Munoz      Disclaimer: This note consists of symbols derived from keyboarding, dictation and/or voice recognition software. As a result, there may be errors in the script that have gone undetected. Please consider this when interpreting information found in this chart.    The information in this document, created by the medical scribe for me, accurately reflects the services I personally performed and the decisions made by me. I have reviewed and approved this document for accuracy.   Amrit Cowan DO, CAQ

## 2018-01-22 NOTE — PATIENT INSTRUCTIONS
Begin Physical Therapy     Begin Oral Steroid (Medrol Dose Pack)     Follow up 3-4 weeks if not improving, or sooner if any concerning changes

## 2018-01-23 ENCOUNTER — TELEPHONE (OUTPATIENT)
Dept: ORTHOPEDICS | Facility: CLINIC | Age: 68
End: 2018-01-23

## 2018-01-23 DIAGNOSIS — M54.2 CERVICALGIA: Primary | ICD-10-CM

## 2018-01-23 NOTE — TELEPHONE ENCOUNTER
LVM for patient to return call to discuss.        Ok to begin PT as soon as she can.  Does take a few days for the oral steroid to take effect.  Hopefully will have results in a few days, but will know exactly how it has helped when she completes her course.    Pennie Taylor MS ATC

## 2018-01-23 NOTE — TELEPHONE ENCOUNTER
Pt LVM with some questions following her appt yesterday. She would like to know when she should start recommended physical therapy. Also she was prescribed prednisone and it has not helped yet. Said she is aware it can take several days to start working but wanting to know when she can expect it to take effect.

## 2018-01-23 NOTE — TELEPHONE ENCOUNTER
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  She was given the number for scheduling and I will call her back as soon as the order has been signed.  Pennie Taylor MS ATC

## 2018-01-23 NOTE — TELEPHONE ENCOUNTER
Patient LVM. Reports that she is anxiously waiting about getting an MRI order. Would like it scheduled as soon as possible and would like a call back when it has been scheduled.

## 2018-01-24 NOTE — TELEPHONE ENCOUNTER
Order signed. We had discussed oral steroid as potential benefit, and still may be of benefit, as long as she is taking.  F/u for review of results and discussion of next steps.  Thanks.  Amrit Cowan DO, [place]

## 2018-01-25 NOTE — TELEPHONE ENCOUNTER
Patient LVM wondering if she can get more prednisone as the one she received was for 6 days and she has her MRI on Saturday. Would like a call back.

## 2018-01-26 NOTE — TELEPHONE ENCOUNTER
I called and spoke with Jessica and scheduled her a follow up appointment for MRI review. She indicates that she is feeling better, but still has some pain. She feels that she can make it through the weekend without the prednisone.    Aric Sullivan, ATC

## 2018-01-27 ENCOUNTER — RADIANT APPOINTMENT (OUTPATIENT)
Dept: MRI IMAGING | Facility: CLINIC | Age: 68
End: 2018-01-27
Attending: PEDIATRICS
Payer: COMMERCIAL

## 2018-01-27 DIAGNOSIS — M54.2 CERVICALGIA: ICD-10-CM

## 2018-01-27 PROCEDURE — 72141 MRI NECK SPINE W/O DYE: CPT | Mod: TC

## 2018-01-29 ENCOUNTER — OFFICE VISIT (OUTPATIENT)
Dept: ORTHOPEDICS | Facility: CLINIC | Age: 68
End: 2018-01-29
Payer: COMMERCIAL

## 2018-01-29 VITALS
HEIGHT: 66 IN | WEIGHT: 139 LBS | DIASTOLIC BLOOD PRESSURE: 70 MMHG | SYSTOLIC BLOOD PRESSURE: 116 MMHG | BODY MASS INDEX: 22.34 KG/M2

## 2018-01-29 DIAGNOSIS — M47.812 ARTHROPATHY OF CERVICAL FACET JOINT: ICD-10-CM

## 2018-01-29 DIAGNOSIS — M54.2 CERVICALGIA: Primary | ICD-10-CM

## 2018-01-29 PROCEDURE — 99213 OFFICE O/P EST LOW 20 MIN: CPT | Performed by: PEDIATRICS

## 2018-01-29 NOTE — PROGRESS NOTES
"Sports Medicine Clinic Visit    PCP: Celeste Jacobs    Jessica Jordan is a 68 year old female who is seen in f/u up for neck pain, after MRI. Since last visit on 1/22/2018 patient has had some improvement with her neck pain.  She is not sure if the improvement is with the oral steroid or with the use of the ibuprofen.  Did undergo an MRI.  Here to review results.  Pain is intermittent.  Feels it increases with overhead motions.  She has not felt she has been able to return to work.    **  Patient is doing better since her last visit, but she believes it is due to having time off of work to rest her neck.     Review of Systems  All other systems reviewed and are negative unless noted above.    This document serves as a record of the services and decisions personally performed and made by Amrit Cowan DO, CAQ. It was created on his behalf by Yovani Corcoran, a trained medical scribe. The creation of this document is based the provider's statements to the medical scribe.  Yovani Corcoran January 29, 2018 3:36 PM      Past Medical History:   Diagnosis Date     Depressive disorder      Past Surgical History:   Procedure Laterality Date     APPENDECTOMY       BREAST SURGERY      Bilateral breast implants     HYSTERECTOMY, PAP NO LONGER INDICATED         Objective  /70  Ht 5' 6\" (1.676 m)  Wt 139 lb (63 kg)  LMP 10/27/1992  BMI 22.44 kg/m2    GENERAL APPEARANCE: healthy, alert and no distress   GAIT: NORMAL  SKIN: no suspicious lesions or rashes  NEURO: Normal strength and tone, mentation intact and speech normal  PSYCH:  mentation appears normal and affect normal/bright  HEENT: no scleral icterus  CV: no extremity edema   RESP: nonlabored breathing     Exam  Cervical Spine Exam    Range of Motion:       extension mild limitation, increased pain          lateral rotation mildly limited bilat, slightly improved        lateral flexion limited to left, slightly improved    Inspection:      Some " loss of lordosis    Tender:      paraspinal muscles on the left     Sensation:     grossly intact througout bilateral upper extremities    Special Tests:     Spurling with focal neck pain   No radicular symptoms with Spurling    Skin:     well perfused       capillary refill brisk    Lymphatics:      no edema noted in the upper extremities         Radiology  Visualized MRI of the cervical spine from 1/27/2018, and reviewed images and report with patient.  MRI demonstrates multilevel degenerative change including facet arthrosis.    Results for orders placed or performed in visit on 01/27/18   MR Cervical Spine w/o Contrast    Narrative    MRI CERVICAL SPINE WITHOUT CONTRAST 1/27/2018 9:01 AM     HISTORY:  Cervicalgia.    TECHNIQUE: Multiplanar, multisequence MRI of the cervical spine  without contrast.     COMPARISON: Cervical spine x-ray 3/6/2017.    FINDINGS: There is slight reversal of the normal cervical lordosis  centered at C4-C5. Grade 1 anterolisthesis of C3 on C4 likely due to  degenerative facet hypertrophy. Vertebral body height is maintained  without evidence of fracture. There are no destructive osseous  lesions. No significant loss of intervertebral disc height or disc  desiccation.    The cervical spinal cord and visualized portions of the thoracic  spinal cord appear normal. The visualized portions of the brainstem  and cerebellum appear normal.    Level by level as follows:    C2-C3:  Marked right-sided facet hypertrophy. No significant spinal  canal or neural foraminal narrowing.     C3-C4:  Grade 1 anterolisthesis with uncovering of the disc. Small  posterior disc bulge. Marked bilateral facet hypertrophy. No  significant spinal canal or neural foraminal narrowing.     C4-C5:  Posterior disc bulge and endplate osteophytic spurring abuts  the ventral spinal cord without significant spinal canal narrowing.  Right greater than left uncinate spurring and facet hypertrophy  results in mild right neural  foraminal narrowing. No significant left  neural foraminal narrowing.     C5-C6:  Posterior disc bulge and endplate osteophytic spurring without  significant spinal canal narrowing. Mild bilateral uncinate spurring  and facet hypertrophy without significant neural foraminal narrowing.    C6-C7:  Small posterior disc bulge and endplate osteophytic spurring.  No significant spinal canal or neural foraminal narrowing.     C7-T1: Marked bilateral facet hypertrophy, left greater than right,  without significant spinal canal or neural foraminal narrowing.     Paraspinous soft tissues are unremarkable.      Impression    IMPRESSION:  Degenerative changes in the mid and lower cervical spine  as described above.      CHRISTIANE ANGEL MD   ==========================================================  Reviewed prior plain films:   XR CERVICAL SPINE 2/3 VWS 3/6/2017 11:47 AM     HISTORY: Neck pain.     COMPARISON: None.     FINDINGS: Grade 1 retrolisthesis of C4. Moderate loss of disc space at  C4-C5-C6-C7. Marginal osteophyte formation at C4 and C5. Disc space at  C2-C3-C4 is preserved. Prevertebral soft tissues are normal.         IMPRESSION: Mild low cervical degenerative change, principally  characterized by loss of disc space.     CARLY MOORE MD    Assessment:  1. Cervicalgia    2. Arthropathy of cervical facet joint        Plan:  Discussed the assessment with the patient.    Pertinent imaging of the area reviewed with the patient.    Discussed:  *Symptom Treatment - Ice, OTCs: Tylenol and Ibuprofen   *Activity Modification  *Chiropractic care - may continue as desired   *Rehab - Physical Therapy   *Imaging - MRI reviewed   *Injection - Corticosteroid injection: facet joint     Topical Treatments: Ice prn   Acetaminophen (Tylenol) 1000mg every 6 hours with food (Maximum of 3000mg/day)  AND/OR  Ibuprofen (Advil) maximum of 800mg three times a day with food (Maximum of 2400mg/day)   Activity Modification: as discussed   Rehab:  Physical Therapy: [facility] - [phone]; referral placed  Referred to Pain Management for Corticosteroid injection; anticipate facet joint injection   Follow up: 2-3 weeks if Corticosteroid injection, otherwise 1 month if not improving with Physical Therapy   Questions answered. The patient indicates understanding of these issues and agrees with the plan.     Amrit Cowan DO, [place]       Disclaimer: This note consists of symbols derived from keyboarding, dictation and/or voice recognition software. As a result, there may be errors in the script that have gone undetected. Please consider this when interpreting information found in this chart.    The information in this document, created by the medical scribe for me, accurately reflects the services I personally performed and the decisions made by me. I have reviewed and approved this document for accuracy.   Amrit Cowan DO, [place]

## 2018-01-29 NOTE — MR AVS SNAPSHOT
After Visit Summary   1/29/2018    Jessica Jordan    MRN: [id number]           Patient Information     Date Of Birth          1950        Visit Information        Provider Department      1/29/2018 2:40 PM Amrit Cowan,  [facility]        Today's Diagnoses     Cervicalgia    -  1    Arthropathy of cervical facet joint          Care Instructions    Acetaminophen (Tylenol) 1000mg every 6 hours with food (Maximum of 3000mg/day)  AND/OR  Ibuprofen (Advil) maximum of 800mg three times a day with food (Maximum of 2400mg/day)     Referral to Physical Therapy placed  Referral to Pain Management, for Corticosteroid injection, placed           Follow-ups after your visit        Additional Services     [facility] PT, HAND, AND CHIROPRACTIC REFERRAL       **This order will print in the [facility] Scheduling Office**    Physical Therapy, Hand Therapy and Chiropractic Care are available through:    *[facility]  *[facility]  *[facility]    Call one number to schedule at any of the above locations: [phone].    Your provider has referred you to: Physical Therapy at [facility] or [facility]    Indication/Reason for Referral: Neck Pain  Onset of Illness:   Therapy Orders: Evaluate and Treat  Special Programs: None  Special Request: None    Doris George      Additional Comments for the Therapist or Chiropractor:       Please be aware that coverage of these services is subject to the terms and limitations of your health insurance plan.  Call member services at your health plan with any benefit or coverage questions.      Please bring the following to your appointment:    *Your personal calendar for scheduling future appointments  *Comfortable clothing            PAIN MANAGEMENT REFERRAL       Your provider has referred you to: FM: [facility] -    Reason for Referral: Procedure Order Facet Procedure:  Medial Branch  Block:  Cervical  Vs Steroid      What is your diagnosis for the patient's pain? Facet arthropathy       For any questions, contact the [facility] at [phone].     **ANY DIAGNOSTIC TESTS THAT ARE NOT IN EPIC SHOULD BE SENT TO THE PAIN CENTER**    REGARDING OPIOID MEDICATIONS:  The discussion of opioids management, appropriateness of therapy, and dosing will be discussed in patients being seen for evaluation.  The pain management clinics are not long-term prescribing clinics, with transition of prescribing of medications ultimately going back to the referring provider/PCP.  If prescribing is taken over at the pain clinic, it is in actively involved patients whom are appropriate for opioids, urine drug screening is completed, and long-term prescribing plan has been determined.  Therefore, we will not be automatically taking over prescribing at the patient's first visit.  Is this agreeable to you? agrees.     Please be aware that coverage of these services is subject to the terms and limitations of your health insurance plan.  Call member services at your health plan with any benefit or coverage questions.      Please bring the following with you to your appointment:    (1) Any X-Rays, CTs or MRIs which have been performed.  Contact the facility where they were done to arrange for  prior to your scheduled appointment.    (2) List of current medications   (3) This referral request   (4) Any documents/labs given to you for this referral                  Who to contact     If you have questions or need follow up information about today's clinic visit or your schedule please contact [facility] directly at [phone].  Normal or non-critical lab and imaging results will be communicated to you by MyChart, letter or phone within 4 business days after the clinic has received the results. If you do not hear from us within 7 days, please contact the clinic  "through [employer] or phone. If you have a critical or abnormal lab result, we will notify you by phone as soon as possible.  Submit refill requests through [employer] or call your pharmacy and they will forward the refill request to us. Please allow 3 business days for your refill to be completed.          Additional Information About Your Visit        [employer] Information     [employer] gives you secure access to your electronic health record. If you see a primary care provider, you can also send messages to your care team and make appointments. If you have questions, please call your primary care clinic.  If you do not have a primary care provider, please call [phone] and they will assist you.        Care EveryWhere ID     This is your Care EveryWhere ID. This could be used by other organizations to access your [facility] medical records  [id number]        Your Vitals Were     Height Last Period BMI (Body Mass Index)             5' 6\" (1.676 m) 10/27/1992 22.44 kg/m2          Blood Pressure from Last 3 Encounters:   01/29/18 116/70   01/22/18 122/78   11/24/17 114/62    Weight from Last 3 Encounters:   01/29/18 139 lb (63 kg)   11/24/17 138 lb 9.6 oz (62.9 kg)   09/18/17 136 lb 9.6 oz (62 kg)              We Performed the Following     [facility] PT, HAND, AND CHIROPRACTIC REFERRAL     PAIN MANAGEMENT REFERRAL        Primary Care Provider Office Phone # Fax #    Celeste Ma Regina Elliott, [facility] [phone] [address] [address]        Equal Access to Services     ERNESTINE FINNEY : Hadii aad ku hadasho Soomaali, waaxda luqadaha, qaybta kaalmada adeegyada, sourav montague . So [facility] [phone].    ATENCIÓN: Si habla español, tiene a mustafa disposición servicios gratuitos de asistencia lingüística. Llame al [phone].    We comply with applicable federal civil rights laws and [place] laws. We do not discriminate on the basis of race, color, national origin, age, " disability, sex, sexual orientation, or gender identity.            Thank you!     Thank you for choosing [facility]  for your care. Our goal is always to provide you with excellent care. Hearing back from our patients is one way we can continue to improve our services. Please take a few minutes to complete the written survey that you may receive in the mail after your visit with us. Thank you!             Your Updated Medication List - Protect others around you: Learn how to safely use, store and throw away your medicines at www.disposemymeds.org.          This list is accurate as of 1/29/18  3:55 PM.  Always use your most recent med list.                   Brand Name Dispense Instructions for use Diagnosis    Biotin 7500 MCG Tabs           calcium carbonate 1250 MG tablet    OS-FRANKI 500 mg Algaaciq. Ca     Take 500 mg by mouth 2 times daily        cyanocolbalamin 500 MCG tablet    vitamin  B-12     Take 500 mcg by mouth daily        FLUoxetine 10 MG capsule    PROzac    180 capsule    Take 2 capsules (20 mg) by mouth daily    Moderate recurrent major depression (H)       methylPREDNISolone 4 MG tablet    MEDROL DOSEPAK    21 tablet    Follow package instructions    Cervicalgia       Multi-vitamin Tabs tablet      Take 1 tablet by mouth daily        omega 3 1000 MG Caps      Take 2 g by mouth        VITAMIN D3 PO      Take by mouth daily

## 2018-01-29 NOTE — LETTER
"    1/29/2018         RE: Jessica Jordan  [address]  [place] [address]        Dear Colleague,    Thank you for referring your patient, Jessica Jordan, to the [facility]. Please see a copy of my visit note below.    Sports Medicine Clinic Visit    PCP: Celeste Jacobs    Jessica Jordan is a 68 year old female who is seen in f/u up for neck pain, after MRI. Since last visit on 1/22/2018 patient has had some improvement with her neck pain.  She is not sure if the improvement is with the oral steroid or with the use of the ibuprofen.  Did undergo an MRI.  Here to review results.  Pain is intermittent.  Feels it increases with overhead motions.  She has not felt she has been able to return to work.    **  Patient is doing better since her last visit, but she believes it is due to having time off of work to rest her neck.     Review of Systems  All other systems reviewed and are negative unless noted above.    This document serves as a record of the services and decisions personally performed and made by Amrit Cowan DO, CAQ. It was created on his behalf by Yovani Corcoran, a trained medical scribe. The creation of this document is based the provider's statements to the medical scribe.  Yovani Corcoran January 29, 2018 3:36 PM      Past Medical History:   Diagnosis Date     Depressive disorder      Past Surgical History:   Procedure Laterality Date     APPENDECTOMY       BREAST SURGERY      Bilateral breast implants     HYSTERECTOMY, PAP NO LONGER INDICATED         Objective  /70  Ht 5' 6\" (1.676 m)  Wt 139 lb (63 kg)  LMP 10/27/1992  BMI 22.44 kg/m2    GENERAL APPEARANCE: healthy, alert and no distress   GAIT: NORMAL  SKIN: no suspicious lesions or rashes  NEURO: Normal strength and tone, mentation intact and speech normal  PSYCH:  mentation appears normal and affect normal/bright  HEENT: no scleral icterus  CV: no extremity edema   RESP: " nonlabored breathing     Exam  Cervical Spine Exam    Range of Motion:       extension mild limitation, increased pain          lateral rotation mildly limited bilat, slightly improved        lateral flexion limited to left, slightly improved    Inspection:      Some loss of lordosis    Tender:      paraspinal muscles on the left     Sensation:     grossly intact througout bilateral upper extremities    Special Tests:     Spurling with focal neck pain   No radicular symptoms with Spurling    Skin:     well perfused       capillary refill brisk    Lymphatics:      no edema noted in the upper extremities         Radiology  Visualized MRI of the cervical spine from 1/27/2018, and reviewed images and report with patient.  MRI demonstrates multilevel degenerative change including facet arthrosis.    Results for orders placed or performed in visit on 01/27/18   MR Cervical Spine w/o Contrast    Narrative    MRI CERVICAL SPINE WITHOUT CONTRAST 1/27/2018 9:01 AM     HISTORY:  Cervicalgia.    TECHNIQUE: Multiplanar, multisequence MRI of the cervical spine  without contrast.     COMPARISON: Cervical spine x-ray 3/6/2017.    FINDINGS: There is slight reversal of the normal cervical lordosis  centered at C4-C5. Grade 1 anterolisthesis of C3 on C4 likely due to  degenerative facet hypertrophy. Vertebral body height is maintained  without evidence of fracture. There are no destructive osseous  lesions. No significant loss of intervertebral disc height or disc  desiccation.    The cervical spinal cord and visualized portions of the thoracic  spinal cord appear normal. The visualized portions of the brainstem  and cerebellum appear normal.    Level by level as follows:    C2-C3:  Marked right-sided facet hypertrophy. No significant spinal  canal or neural foraminal narrowing.     C3-C4:  Grade 1 anterolisthesis with uncovering of the disc. Small  posterior disc bulge. Marked bilateral facet hypertrophy. No  significant spinal canal  or neural foraminal narrowing.     C4-C5:  Posterior disc bulge and endplate osteophytic spurring abuts  the ventral spinal cord without significant spinal canal narrowing.  Right greater than left uncinate spurring and facet hypertrophy  results in mild right neural foraminal narrowing. No significant left  neural foraminal narrowing.     C5-C6:  Posterior disc bulge and endplate osteophytic spurring without  significant spinal canal narrowing. Mild bilateral uncinate spurring  and facet hypertrophy without significant neural foraminal narrowing.    C6-C7:  Small posterior disc bulge and endplate osteophytic spurring.  No significant spinal canal or neural foraminal narrowing.     C7-T1: Marked bilateral facet hypertrophy, left greater than right,  without significant spinal canal or neural foraminal narrowing.     Paraspinous soft tissues are unremarkable.      Impression    IMPRESSION:  Degenerative changes in the mid and lower cervical spine  as described above.      CHRISTIANE ANGEL MD   ==========================================================  Reviewed prior plain films:   XR CERVICAL SPINE 2/3 VWS 3/6/2017 11:47 AM     HISTORY: Neck pain.     COMPARISON: None.     FINDINGS: Grade 1 retrolisthesis of C4. Moderate loss of disc space at  C4-C5-C6-C7. Marginal osteophyte formation at C4 and C5. Disc space at  C2-C3-C4 is preserved. Prevertebral soft tissues are normal.         IMPRESSION: Mild low cervical degenerative change, principally  characterized by loss of disc space.     CARLY MOORE MD    Assessment:  1. Cervicalgia    2. Arthropathy of cervical facet joint        Plan:  Discussed the assessment with the patient.    Pertinent imaging of the area reviewed with the patient.    Discussed:  *Symptom Treatment - Ice, OTCs: Tylenol and Ibuprofen   *Activity Modification  *Chiropractic care - may continue as desired   *Rehab - Physical Therapy   *Imaging - MRI reviewed   *Injection - Corticosteroid injection:  facet joint     Topical Treatments: Ice prn   Acetaminophen (Tylenol) 1000mg every 6 hours with food (Maximum of 3000mg/day)  AND/OR  Ibuprofen (Advil) maximum of 800mg three times a day with food (Maximum of 2400mg/day)   Activity Modification: as discussed   Rehab: Physical Therapy: [facility] - [phone]; referral placed  Referred to Pain Management for Corticosteroid injection; anticipate facet joint injection   Follow up: 2-3 weeks if Corticosteroid injection, otherwise 1 month if not improving with Physical Therapy   Questions answered. The patient indicates understanding of these issues and agrees with the plan.     Amrit Cowan DO, CAQ       Disclaimer: This note consists of symbols derived from keyboarding, dictation and/or voice recognition software. As a result, there may be errors in the script that have gone undetected. Please consider this when interpreting information found in this chart.    The information in this document, created by the medical scribe for me, accurately reflects the services I personally performed and the decisions made by me. I have reviewed and approved this document for accuracy.   Amrit Cowan DO, CAQ      Again, thank you for allowing me to participate in the care of your patient.        Sincerely,        Amrit Cowan DO

## 2018-01-29 NOTE — PATIENT INSTRUCTIONS
Acetaminophen (Tylenol) 1000mg every 6 hours with food (Maximum of 3000mg/day)  AND/OR  Ibuprofen (Advil) maximum of 800mg three times a day with food (Maximum of 2400mg/day)     Referral to Physical Therapy placed  Referral to Pain Management, for injection, placed --target facet joints

## 2018-01-30 ENCOUNTER — THERAPY VISIT (OUTPATIENT)
Dept: PHYSICAL THERAPY | Facility: CLINIC | Age: 68
End: 2018-01-30
Payer: COMMERCIAL

## 2018-01-30 ENCOUNTER — TELEPHONE (OUTPATIENT)
Dept: PALLIATIVE MEDICINE | Facility: CLINIC | Age: 68
End: 2018-01-30

## 2018-01-30 DIAGNOSIS — M54.2 CERVICALGIA: Primary | ICD-10-CM

## 2018-01-30 PROCEDURE — G8981 BODY POS CURRENT STATUS: HCPCS | Mod: GP | Performed by: PHYSICAL THERAPIST

## 2018-01-30 PROCEDURE — 97110 THERAPEUTIC EXERCISES: CPT | Mod: GP | Performed by: PHYSICAL THERAPIST

## 2018-01-30 PROCEDURE — G8982 BODY POS GOAL STATUS: HCPCS | Mod: GP | Performed by: PHYSICAL THERAPIST

## 2018-01-30 PROCEDURE — 97140 MANUAL THERAPY 1/> REGIONS: CPT | Mod: GP | Performed by: PHYSICAL THERAPIST

## 2018-01-30 PROCEDURE — 97161 PT EVAL LOW COMPLEX 20 MIN: CPT | Mod: GP | Performed by: PHYSICAL THERAPIST

## 2018-01-30 NOTE — PROGRESS NOTES
[facility] Initial Evaluation  Subjective:  Patient is a 68 year old female presenting with rehab left ankle/foot hpi.                                      Pertinent medical history includes:  Depression.  Medical allergies: yes.  Other surgeries include:  Other.  Current medications:  Anti-depressants.  Current occupation is [employer].                                    Objective:  System    Physical Exam    General     ROS    Assessment/Plan:

## 2018-01-30 NOTE — LETTER
April 2, 2018    Jessica Jordan  [address]   [place] [address]    Dear Jessica,                                                                         You have been referred to [facility]. We have been unable to contact you by telephone to schedule your appointment. Please call our clinic at [phone] to schedule this appointment.       Sincerely,        [facility] [place]

## 2018-01-30 NOTE — TELEPHONE ENCOUNTER
PA pending additional information - please specify exact level, laterality to be injected.Also needing what injection/procedure to be done.Routing to nursing to determine.        Kitty VILLALBA    [facility]

## 2018-01-30 NOTE — TELEPHONE ENCOUNTER
Called patient to schedule [facility]B vs JUDI. Patient is calling insurance first to check her coverage.     Paige Prasad    Pain Management Clinic

## 2018-01-30 NOTE — LETTER
DEPARTMENT OF HEALTH AND HUMAN SERVICES  CENTERS FOR MEDICARE & MEDICAID SERVICES    PLAN/UPDATED PLAN OF PROGRESS FOR OUTPATIENT REHABILITATION    PATIENTS NAME:  Jessica Jordan   : 1950  PROVIDER NUMBER:    [id number]  [id number]   PROVIDER NAME: K[facility]ICE WAGNER PT  MEDICAL RECORD NUMBER: [id number]     START OF CARE DATE:  SOC Date: 18   TYPE:  PT    PRIMARY/TREATMENT DIAGNOSIS: (Pertinent Medical Diagnosis)  Cervicalgia    VISITS FROM START OF CARE:  Rxs Used: 1     [facility] Initial Evaluation    Subjective:  Patient is a 68 year old female presenting with rehab cervical spine hpi.   Jessica Jordan is a 68 year old female with a cervical spine condition.  Condition occurred with:  Lifting.  Condition occurred: for unknown reasons.  This is a chronic condition  Patient reports onset of neck pain approximately in 2017 after lifting weights at the gym. Patient reports lifting a free weight overhead and it began to hurt following that session at the gym.  Patient reports pain:  Cervical left side.  Radiates to:  Shoulder left and upper arm left.  Pain is described as aching and burning and is constant and reported as 3/10.  Associated symptoms:  Loss of motion/stiffness. Pain is worse during the day.  Symptoms are exacerbated by rotating head, looking up or down, lifting, carrying, stress and driving (raising arms overhead) and relieved by rest and NSAID's.  Since onset symptoms are unchanged.  Special tests:  X-ray and MRI (see epic).  General health as reported by patient is good.  MD order 2018.             Red flags:  None as reported by the patient.    Objective:  Standing Alignment:    Cervical/Thoracic:  Forward head  Shoulder/UE:  Rounded shoulders        Cervical/Thoracic Evaluation  AROM:  AROM Cervical:  Flexion:            WNL, painful  Extension:       WNL, painful  Rotation:         Left: Slow WFL, painful     Right: Slow WFL, painful  Side Bend:   "    Left: 30 deg, \"tight\"     Right:  50 deg, \"stiff\"    Headaches: none  Cervical Myotomes:  Cervical myotomes: grossly 4+/5 bilaterally.  Cervical Dermatomes:  normal  PATIENTS NAME:  Jessica Jordan   : 1950    Cervical Palpation:    Tenderness present at Left:    Upper Trap; Levator; Erector Spinae; Facet and Suboccipitals  Tenderness present at Right:    Upper Trap; Levator; Facet and Suboccipitals    Functional Tests:  Core strength and proprioception spine wnl: negative spurlings testing bilaterally.    Cervical Stability/Joint Clearing:    Left positive at:Mobility  Right positive at:  Mobility    Spinal Segmental Conclusions:    Level:  Hypo at C3, C2, C4, C5, C6, C7, T4, T5, T6, T7, T8 and T3    Assessment/Plan:    Patient is a 68 year old female with cervical complaints.    Patient has the following significant findings with corresponding treatment plan.                Diagnosis 1:  Neck pain  Pain -  hot/cold therapy, mechanical traction, manual therapy, self management, education and home program  Decreased ROM/flexibility - manual therapy, therapeutic exercise, therapeutic activity and home program  Decreased joint mobility - manual therapy, therapeutic exercise, therapeutic activity and home program  Decreased strength - therapeutic exercise, therapeutic activities and home program  Decreased function - therapeutic activities and home program  Impaired posture - neuro re-education, therapeutic activities and home program    Therapy Evaluation Codes:   1) History comprised of:   Personal factors that impact the plan of care:      None.    Comorbidity factors that impact the plan of care are:      None.     Medications impacting care: None.  2) Examination of Body Systems comprised of:   Body structures and functions that impact the plan of care:      Cervical spine.   Activity limitations that impact the plan of care are:      Bathing, Cooking, Dressing and Lifting.  3) Clinical presentation " "characteristics are:   Stable/Uncomplicated.  4) Decision-Making    Low complexity using standardized patient assessment instrument and/or measureable assessment of functional outcome.  Cumulative Therapy Evaluation is: Low complexity.    Previous and current functional limitations:  (See Goal Flow Sheet for this information)    Short term and Long term goals: (See Goal Flow Sheet for this information)     PATIENTS NAME:  Jessica Jordan   : 1950    Communication ability:  Patient appears to be able to clearly communicate and understand verbal and written communication and follow directions correctly.  Treatment Explanation - The following has been discussed with the patient:   RX ordered/plan of care  Anticipated outcomes  Possible risks and side effects  This patient would benefit from PT intervention to resume normal activities.   Rehab potential is good.    Frequency:  1 X week, once daily  Duration:  for 6 weeks  Discharge Plan:  Achieve all LTG.  Independent in home treatment program.  Reach maximal therapeutic benefit.    Please refer to the daily flowsheet for treatment today, total treatment time and time spent performing 1:1 timed codes.       Caregiver Signature/Credentials _____________________________ Date ________       Treating Provider: Willy Landrum DPT   I have reviewed and certified the need for these services and plan of treatment while under my care.        PHYSICIAN'S SIGNATURE:   _________________________________________  Date___________   Amrit Cowan MD    Certification period:  Beginning of Cert date period: 18 to  End of Cert period date: 18     Functional Level Progress Report: Please see attached \"Goal Flow sheet for Functional level.\"    ____X____ Continue Services or       ________ DC Services                Service dates: From  SOC Date: 18 date to present                         "

## 2018-01-30 NOTE — TELEPHONE ENCOUNTER
Sending to providers to determine     Deb Toscano RN, [facility]  Pain Clinic Care Coordinator

## 2018-01-30 NOTE — PROGRESS NOTES
"[facility] Initial Evaluation  Subjective:  Patient is a 68 year old female presenting with rehab cervical spine hpi.   Jessica Jordan is a 68 year old female with a cervical spine condition.  Condition occurred with:  Lifting.  Condition occurred: for unknown reasons.  This is a chronic condition  Patient reports onset of neck pain approximately in August 2017 after lifting weights at the gym. Patient reports lifting a free weight overhead and it began to hurt following that session at the gym..    Patient reports pain:  Cervical left side.  Radiates to:  Shoulder left and upper arm left.  Pain is described as aching and burning and is constant and reported as 3/10.  Associated symptoms:  Loss of motion/stiffness. Pain is worse during the day.  Symptoms are exacerbated by rotating head, looking up or down, lifting, carrying, stress and driving (raising arms overhead) and relieved by rest and NSAID's.  Since onset symptoms are unchanged.  Special tests:  X-ray and MRI (see epic).      General health as reported by patient is good.                      Red flags:  None as reported by the patient.                        Objective:  Standing Alignment:    Cervical/Thoracic:  Forward head  Shoulder/UE:  Rounded shoulders                                  Cervical/Thoracic Evaluation    AROM:  AROM Cervical:    Flexion:            WNL, painful  Extension:       WNL, painful  Rotation:         Left: Slow WFL, painful     Right: Slow WFL, painful  Side Bend:      Left: 30 deg, \"tight\"     Right:  50 deg, \"stiff\"      Headaches: none  Cervical Myotomes:  Cervical myotomes: grossly 4+/5 bilaterally.                      Cervical Dermatomes:  normal                    Cervical Palpation:    Tenderness present at Left:    Upper Trap; Levator; Erector Spinae; Facet and Suboccipitals  Tenderness present at Right:    Upper Trap; Levator; Facet and Suboccipitals  Functional Tests:  Core strength and " proprioception spine wnl: negative spurlings testing bilaterally.    Cervical Stability/Joint Clearing:    Left positive at:Mobility  Right positive at:  Mobility  Spinal Segmental Conclusions:    Level:  Hypo at C3, C2, C4, C5, C6, C7, T4, T5, T6, T7, T8 and T3                                                General     ROS    Assessment/Plan:    Patient is a 68 year old female with cervical complaints.    Patient has the following significant findings with corresponding treatment plan.                Diagnosis 1:  Neck pain  Pain -  hot/cold therapy, mechanical traction, manual therapy, self management, education and home program  Decreased ROM/flexibility - manual therapy, therapeutic exercise, therapeutic activity and home program  Decreased joint mobility - manual therapy, therapeutic exercise, therapeutic activity and home program  Decreased strength - therapeutic exercise, therapeutic activities and home program  Decreased function - therapeutic activities and home program  Impaired posture - neuro re-education, therapeutic activities and home program    Therapy Evaluation Codes:   1) History comprised of:   Personal factors that impact the plan of care:      None.    Comorbidity factors that impact the plan of care are:      None.     Medications impacting care: None.  2) Examination of Body Systems comprised of:   Body structures and functions that impact the plan of care:      Cervical spine.   Activity limitations that impact the plan of care are:      Bathing, Cooking, Dressing and Lifting.  3) Clinical presentation characteristics are:   Stable/Uncomplicated.  4) Decision-Making    Low complexity using standardized patient assessment instrument and/or measureable assessment of functional outcome.  Cumulative Therapy Evaluation is: Low complexity.    Previous and current functional limitations:  (See Goal Flow Sheet for this information)    Short term and Long term goals: (See Goal Flow Sheet for this  information)     Communication ability:  Patient appears to be able to clearly communicate and understand verbal and written communication and follow directions correctly.  Treatment Explanation - The following has been discussed with the patient:   RX ordered/plan of care  Anticipated outcomes  Possible risks and side effects  This patient would benefit from PT intervention to resume normal activities.   Rehab potential is good.    Frequency:  1 X week, once daily  Duration:  for 6 weeks  Discharge Plan:  Achieve all LTG.  Independent in home treatment program.  Reach maximal therapeutic benefit.    Please refer to the daily flowsheet for treatment today, total treatment time and time spent performing 1:1 timed codes.

## 2018-01-31 NOTE — TELEPHONE ENCOUNTER
PA pending additional information - office note 1/29 to be completed and signed by Dr. Cowan.      Kitty VILLALBA    [facility]

## 2018-01-31 NOTE — TELEPHONE ENCOUNTER
From review of Dr Cowan's recent note, the pain is on the left and thought to be facet pain.  She has had improvement with oral steroids.  Would recommend cervical medial branch block left C3, C4, C5.    Ayad Ferris MD  [facility]

## 2018-02-01 NOTE — TELEPHONE ENCOUNTER
Faxed completed PA form and supporting documentation for CMBB to [employer]. Right fax confirmation 2/1 at 1:42 pm.        Kitty VILLALBA    [facility]

## 2018-02-02 ENCOUNTER — THERAPY VISIT (OUTPATIENT)
Dept: PHYSICAL THERAPY | Facility: CLINIC | Age: 68
End: 2018-02-02
Payer: COMMERCIAL

## 2018-02-02 DIAGNOSIS — M54.2 CERVICALGIA: Primary | ICD-10-CM

## 2018-02-02 PROCEDURE — 97140 MANUAL THERAPY 1/> REGIONS: CPT | Mod: GP | Performed by: PHYSICAL THERAPIST

## 2018-02-02 PROCEDURE — 97110 THERAPEUTIC EXERCISES: CPT | Mod: GP | Performed by: PHYSICAL THERAPIST

## 2018-02-09 ENCOUNTER — THERAPY VISIT (OUTPATIENT)
Dept: PHYSICAL THERAPY | Facility: CLINIC | Age: 68
End: 2018-02-09
Payer: MEDICARE

## 2018-02-09 DIAGNOSIS — M54.2 CERVICALGIA: Primary | ICD-10-CM

## 2018-02-09 PROCEDURE — 97110 THERAPEUTIC EXERCISES: CPT | Mod: GP | Performed by: PHYSICAL THERAPIST

## 2018-02-09 PROCEDURE — 97140 MANUAL THERAPY 1/> REGIONS: CPT | Mod: GP | Performed by: PHYSICAL THERAPIST

## 2018-02-16 ENCOUNTER — OFFICE VISIT (OUTPATIENT)
Dept: FAMILY MEDICINE | Facility: CLINIC | Age: 68
End: 2018-02-16
Payer: COMMERCIAL

## 2018-02-16 VITALS
OXYGEN SATURATION: 100 % | TEMPERATURE: 98.3 F | DIASTOLIC BLOOD PRESSURE: 82 MMHG | RESPIRATION RATE: 16 BRPM | WEIGHT: 137.8 LBS | BODY MASS INDEX: 22.24 KG/M2 | SYSTOLIC BLOOD PRESSURE: 120 MMHG | HEART RATE: 74 BPM

## 2018-02-16 DIAGNOSIS — Z23 NEED FOR STREPTOCOCCUS PNEUMONIAE VACCINATION: ICD-10-CM

## 2018-02-16 DIAGNOSIS — F33.1 MODERATE RECURRENT MAJOR DEPRESSION (H): Primary | ICD-10-CM

## 2018-02-16 PROCEDURE — 99213 OFFICE O/P EST LOW 20 MIN: CPT | Mod: 25 | Performed by: NURSE PRACTITIONER

## 2018-02-16 PROCEDURE — 90732 PPSV23 VACC 2 YRS+ SUBQ/IM: CPT | Performed by: NURSE PRACTITIONER

## 2018-02-16 PROCEDURE — G0009 ADMIN PNEUMOCOCCAL VACCINE: HCPCS | Performed by: NURSE PRACTITIONER

## 2018-02-16 ASSESSMENT — PAIN SCALES - GENERAL: PAINLEVEL: MILD PAIN (2)

## 2018-02-16 NOTE — NURSING NOTE
"Chief Complaint   Patient presents with     Recheck Medication     follow-up for new med       Initial /82 (BP Location: Right arm, Cuff Size: Adult Regular)  Pulse 74  Temp 98.3  F (36.8  C)  Resp 16  Wt 137 lb 12.8 oz (62.5 kg)  LMP 10/27/1992  SpO2 100%  Breastfeeding? No  BMI 22.24 kg/m2 Estimated body mass index is 22.24 kg/(m^2) as calculated from the following:    Height as of 1/29/18: 5' 6\" (1.676 m).    Weight as of this encounter: 137 lb 12.8 oz (62.5 kg).  Medication Reconciliation: complete       Sun Harris CMA    "

## 2018-02-16 NOTE — MR AVS SNAPSHOT
After Visit Summary   2/16/2018    Jessica Jordan    MRN: [id number]           Patient Information     Date Of Birth          1950        Visit Information        Provider Department      2/16/2018 2:20 PM Celeste Jacobs APRN [facility]        Today's Diagnoses     Moderate recurrent major depression (H)    -  1    Need for Streptococcus pneumoniae vaccination          Care Instructions    [facility]    If you have any questions regarding to your visit please contact your care team:     Team Pink:   Clinic Hours Telephone Number   Internal Medicine:  Dr. Yodit Jacobs, NP       7am-7pm  Monday - Thursday   7am-5pm  Fridays  [phone]  (Appointment scheduling available 24/7)    Questions about your visit?  Team Line  [phone]   [facility] [place] [facility] 11am-9pm Monday-Friday Saturday-Sunday- 9am-5pm   [place] - 5pm-9pm Monday-Friday Saturday-Sunday- 9am-5pm  [phone] - [facility]  [phone] - [place]       What options do I have for visits at the clinic other than the traditional office visit?  To expand how we care for you, many of our providers are utilizing electronic visits (e-visits) and telephone visits, when medically appropriate, for interactions with their patients rather than a visit in the clinic.   We also offer nurse visits for many medical concerns. Just like any other service, we will bill your insurance company for this type of visit based on time spent on the phone with your provider. Not all insurance companies cover these visits. Please check with your medical insurance if this type of visit is covered. You will be responsible for any charges that are not paid by your insurance.      E-visits via Keepcon:  generally incur a $35.00 fee.  Telephone visits:  Time spent on the phone: *charged based on time that is spent on the phone in increments of 10  minutes. Estimated cost:   5-10 mins $30.00   11-20 mins. $59.00   21-30 mins. $85.00   Use Instacoverhart (secure email communication and access to your chart) to send your primary care provider a message or make an appointment. Ask someone on your Team how to sign up for [employer].    For a Price Quote for your services, please call our Consumer Price Line at [phone].    As always, Thank you for trusting us with your health care needs!    Micaela ROMERO CMA ([facility])            Follow-ups after your visit        Your next 10 appointments already scheduled     Feb 23, 2018  2:10 PM CST   [facility] with Willy Landrum, PT   KANDICE REARDON PT ([facility]ICE Reardon)    [address]  [address]  [place] [address] [phone]              Who to contact     If you have questions or need follow up information about today's clinic visit or your schedule please contact [facility] directly at [phone].  Normal or non-critical lab and imaging results will be communicated to you by Instacoverhart, letter or phone within 4 business days after the clinic has received the results. If you do not hear from us within 7 days, please contact the clinic through Instacoverhart or phone. If you have a critical or abnormal lab result, we will notify you by phone as soon as possible.  Submit refill requests through RRsat or call your pharmacy and they will forward the refill request to us. Please allow 3 business days for your refill to be completed.          Additional Information About Your Visit        RRsat Information     RRsat gives you secure access to your electronic health record. If you see a primary care provider, you can also send messages to your care team and make appointments. If you have questions, please call your primary care clinic.  If you do not have a primary care provider, please call [phone] and they will assist you.        Care EveryWhere ID     This is your Care EveryWhere ID. This could be used by  other organizations to access your [place] medical records  [id number]        Your Vitals Were     Pulse Temperature Respirations Last Period Pulse Oximetry Breastfeeding?    74 98.3  F (36.8  C) 16 10/27/1992 100% No    BMI (Body Mass Index)                   22.24 kg/m2            Blood Pressure from Last 3 Encounters:   02/16/18 120/82   01/29/18 116/70   01/22/18 122/78    Weight from Last 3 Encounters:   02/16/18 137 lb 12.8 oz (62.5 kg)   01/29/18 139 lb (63 kg)   11/24/17 138 lb 9.6 oz (62.9 kg)              We Performed the Following     ADMIN: Vaccine, Initial (89354)     Pneumococcal vaccine 23 valent PPSV23  (Pneumovax) [26269]        Primary Care Provider Office Phone # Fax #    Celeste Ma MAGALIS [facility] [phone]       [address] [address]        Equal Access to Services     [facility] : Hadii aad ku hadasho Soomaali, waaxda luqadaha, qaybta kaalmada adeegyada, waxay idiin hayaan lisa poseyaralelo montague . So [facility] [phone].    ATENCIÓN: Si habla español, tiene a mustafa disposición servicios gratuitos de asistencia lingüística. Llame al [phone].    We comply with applicable federal civil rights laws and [place] laws. We do not discriminate on the basis of race, color, national origin, age, disability, sex, sexual orientation, or gender identity.            Thank you!     Thank you for choosing [facility]  for your care. Our goal is always to provide you with excellent care. Hearing back from our patients is one way we can continue to improve our services. Please take a few minutes to complete the written survey that you may receive in the mail after your visit with us. Thank you!             Your Updated Medication List - Protect others around you: Learn how to safely use, store and throw away your medicines at www.disposemymeds.org.          This list is accurate as of 2/16/18  3:06 PM.  Always use your most recent med list.                    Brand Name Dispense Instructions for use Diagnosis    Biotin 7500 MCG Tabs           calcium carbonate 1250 MG tablet    OS-FRANKI 500 mg [place]. Ca     Take 500 mg by mouth 2 times daily        cyanocolbalamin 500 MCG tablet    vitamin  B-12     Take 500 mcg by mouth daily        FLUoxetine 10 MG capsule    PROzac    180 capsule    Take 2 capsules (20 mg) by mouth daily    Moderate recurrent major depression (H)       Multi-vitamin Tabs tablet      Take 1 tablet by mouth daily        omega 3 1000 MG Caps      Take 2 g by mouth        VITAMIN C PO      Take 250 mg by mouth daily        VITAMIN D3 PO      Take by mouth daily

## 2018-02-16 NOTE — PROGRESS NOTES
SUBJECTIVE:   Jessica Jordan is a 68 year old female who presents to clinic today for the following health issues:      Medication Followup of Fluoxetine    Taking Medication as prescribed: yes    Side Effects:  Loss of sleep at night    Medication Helping Symptoms:  yes     Patient decided to resume prozac 4 weeks ago.  She started taking 10 mg daily due to depressed mood, anhedonia, poor appetite, fatigue.  She self increased to 20 mg daily 2 weeks ago and notes her symptoms are improving.  She denies any thoughts of suicide or self harm.        Problem list and histories reviewed & adjusted, as indicated.  Additional history: as documented    Patient Active Problem List   Diagnosis     Moderate recurrent major depression (H)     Vitamin D deficiency     Family history of ovarian cancer     Iron deficiency anemia due to chronic blood loss     Osteopenia     Past Surgical History:   Procedure Laterality Date     APPENDECTOMY       BREAST SURGERY      Bilateral breast implants     HYSTERECTOMY, PAP NO LONGER INDICATED         Social History   Substance Use Topics     Smoking status: Never Smoker     Smokeless tobacco: Never Used     Alcohol use Yes      Comment: once a week     History reviewed. No pertinent family history.      Current Outpatient Prescriptions   Medication Sig Dispense Refill     Ascorbic Acid (VITAMIN C PO) Take 250 mg by mouth daily       multivitamin, therapeutic with minerals (MULTI-VITAMIN) TABS tablet Take 1 tablet by mouth daily       FLUoxetine (PROZAC) 10 MG capsule Take 2 capsules (20 mg) by mouth daily 180 capsule 1     cyanocolbalamin (VITAMIN  B-12) 500 MCG tablet Take 500 mcg by mouth daily       calcium carbonate (OS-FRANKI 500 MG [place]. CA) 500 MG tablet Take 500 mg by mouth 2 times daily       Biotin 7500 MCG TABS        omega 3 1000 MG CAPS Take 2 g by mouth       Cholecalciferol (VITAMIN D3 PO) Take by mouth daily       Allergies   Allergen Reactions     Codeine      Penicillins       Seasonal Allergies Other (See Comments)     Headache, runny nose     BP Readings from Last 3 Encounters:   02/16/18 120/82   01/29/18 116/70   01/22/18 122/78    Wt Readings from Last 3 Encounters:   02/16/18 137 lb 12.8 oz (62.5 kg)   01/29/18 139 lb (63 kg)   11/24/17 138 lb 9.6 oz (62.9 kg)                  Labs reviewed in EPIC    Reviewed and updated as needed this visit by clinical staff       Reviewed and updated as needed this visit by Provider         ROS:  Constitutional, HEENT, cardiovascular, pulmonary, gi and gu systems are negative, except as otherwise noted.    OBJECTIVE:     /82 (BP Location: Right arm, Cuff Size: Adult Regular)  Pulse 74  Temp 98.3  F (36.8  C)  Resp 16  Wt 137 lb 12.8 oz (62.5 kg)  LMP 10/27/1992  SpO2 100%  Breastfeeding? No  BMI 22.24 kg/m2  Body mass index is 22.24 kg/(m^2).  GENERAL: healthy, alert and no distress  RESP: lungs clear to auscultation - no rales, rhonchi or wheezes  CV: regular rate and rhythm, normal S1 S2, no S3 or S4, no murmur, click or rub, no peripheral edema and peripheral pulses strong  MS: no gross musculoskeletal defects noted, no edema  PSYCH: mentation appears normal, affect normal/bright    Diagnostic Test Results:  none     ASSESSMENT/PLAN:     1. Moderate recurrent major depression (H)  Continue prozac at 20 mg daily at this time.  Patient to contact clinic if she feels she needs a further increase in dosage.  Patient may need to switch to [facility] in the future due to insurance changes.    2. Need for Streptococcus pneumoniae vaccination    - Pneumococcal vaccine 23 valent PPSV23  (Pneumovax) [12301]  - ADMIN: Vaccine, Initial (82353)    FUTURE APPOINTMENTS:       - Follow-up for annual visit or as needed    MAGALIS Mcneil [facility]

## 2018-02-16 NOTE — PATIENT INSTRUCTIONS
[facility]    If you have any questions regarding to your visit please contact your care team:     Team Pink:   Clinic Hours Telephone Number   Internal Medicine:  Dr. Yodit Jacobs NP       7am-7pm  Monday - Thursday   7am-5pm  Fridays  [phone]  (Appointment scheduling available 24/7)    Questions about your visit?  Team Line  [phone]   [facility] - [place] [facility] - 11am-9pm Monday-Friday Saturday-Sunday- 9am-5pm   [place] - 5pm-9pm Monday-Friday Saturday-Sunday- 9am-5pm  [phone] - [place]   [phone] - [place]       What options do I have for visits at the clinic other than the traditional office visit?  To expand how we care for you, many of our providers are utilizing electronic visits (e-visits) and telephone visits, when medically appropriate, for interactions with their patients rather than a visit in the clinic.   We also offer nurse visits for many medical concerns. Just like any other service, we will bill your insurance company for this type of visit based on time spent on the phone with your provider. Not all insurance companies cover these visits. Please check with your medical insurance if this type of visit is covered. You will be responsible for any charges that are not paid by your insurance.      E-visits via GigSky:  generally incur a $35.00 fee.  Telephone visits:  Time spent on the phone: *charged based on time that is spent on the phone in increments of 10 minutes. Estimated cost:   5-10 mins $30.00   11-20 mins. $59.00   21-30 mins. $85.00   Use [employer] (secure email communication and access to your chart) to send your primary care provider a message or make an appointment. Ask someone on your Team how to sign up for [employer].    For a Price Quote for your services, please call our Consumer Price Line at [phone].    As always, Thank you for trusting us with your health care  needs!    Micaela ROMERO CMA ([facility])

## 2018-02-23 PROCEDURE — G8983 BODY POS D/C STATUS: HCPCS | Mod: GP | Performed by: PHYSICAL THERAPIST

## 2018-02-23 PROCEDURE — G8982 BODY POS GOAL STATUS: HCPCS | Mod: GP | Performed by: PHYSICAL THERAPIST

## 2018-02-23 PROCEDURE — G8981 BODY POS CURRENT STATUS: HCPCS | Mod: GP | Performed by: PHYSICAL THERAPIST

## 2018-03-21 NOTE — TELEPHONE ENCOUNTER
Can I get an update on insurance on this please.     Deb Toscano RN, [facility]  Pain Clinic Care Coordinator

## 2018-03-23 NOTE — TELEPHONE ENCOUNTER
[facility] and [facility] state [facility] was approved but has . Informed them we never received any authorization. I was instructed to re-fax everything and note the authorization number and that the patient has not had procedure done.      Re-faxed to [facility], right fax confirmation 3/23 at 9:16 am      Kitty VILLALBA    [facility]

## 2018-04-02 NOTE — TELEPHONE ENCOUNTER
PA approved.  Effective date: 3/23/18-5/7/18  PA reference #: [id number]  Pt. notified:   Yes   Pt. informed directly.      Kitty VILLALBA    [facility]

## 2018-04-02 NOTE — TELEPHONE ENCOUNTER
Reached out to patient 2x.   Phone issues.   Reached out via [employer] and letter to patient to schedule CMBB #1.            Lori MCFARLAND    [facility] [place]

## 2018-04-05 ENCOUNTER — TELEPHONE (OUTPATIENT)
Dept: ORTHOPEDICS | Facility: CLINIC | Age: 68
End: 2018-04-05

## 2018-04-05 NOTE — TELEPHONE ENCOUNTER
My chart read by patient:    From   Lori Lobo    To   Jessica Jordan    Sent and Delivered   4/2/2018  8:49 AM         Last Read in MyChart   4/2/2018  2:39 PM by Jessica Jordan        No appointment schedule for MBB at this time.      Sun ESCOBARN-RN Care Coordinator  [facility] [place]

## 2018-04-05 NOTE — TELEPHONE ENCOUNTER
Patient called at 17:21. She said that she has been approved for the treatment on her neck (she thinks injections) by insurance company. She currently doesn't feel like she needs the injections, but she wanted to let us know that she is doing well, and would like a call back to know if she needs to do anything about the injections or if she needs to see Dr. Cowan back in clinic.  [phone]    Lashawn TORREZ (R)

## 2018-04-06 NOTE — TELEPHONE ENCOUNTER
She may hold on injections and contact clinic if any questions/concerns.  Thanks.  Amrit Cowan DO, CAQ

## 2018-04-06 NOTE — TELEPHONE ENCOUNTER
Spoke with patient. She states she is doing well and physical therapy has been very helpful. She will contact us or follow up if pain returns or she has questions.     Leigh Julian M.Ed., ATR, ATC

## 2018-04-16 NOTE — TELEPHONE ENCOUNTER
No response from patient. Closing    Sun ESCOBARN-RN Care Coordinator  [facility] [place]

## 2018-05-30 NOTE — PROGRESS NOTES
Subjective:  HPI                    Objective:  System    Physical Exam    General     ROS    Assessment/Plan:    DISCHARGE REPORT    Patient did not return to PT following the last PT session on 2/9/2018.  Patient will be discharged at this time due to poor follow through with PT services. See below for the most recent subjective and objective findings.    SUBJECTIVE  Subjective changes noted by patient:  Subjective: Patient reports much less pain over the last 3 weeks with 1-2 exacerbations of pain.  Patient started a new job and is sitting most of the day, but is more aware of her sitting posture.    Current pain level is  Current Pain level: 1/10.     Previous pain level was   Initial Pain level: 3/10.   Changes in function:  Yes (See Goal flowsheet attached for changes in current functional level)  Adverse reaction to treatment or activity: None    OBJECTIVE  Changes noted in objective findings:  Yes,   Objective: Cervical AROM WNL throughout except sidebend 40 deg bilat. Sidebend WNL bialterally following todays session. Much less tender throughout CS and upper back. hypomobile thoracic spine.     ASSESSMENT/PLAN  Updated problem list and treatment plan: Diagnosis 1:  Neck pain    STG/LTGs have been met or progress has been made towards goals:    Assessment of Progress: The patient's condition is improving.  Self Management Plans:  Patient has been instructed in a home treatment program.  Patient  has been instructed in self management of symptoms.  I have re-evaluated this patient and find that the nature, scope, duration and intensity of the therapy is appropriate for the medical condition of the patient.  Jessica continues to require the following intervention to meet STG and LTG's:      Recommendations:  This patient is  to be discharged from therapy and continue their home treatment program.    Please refer to the daily flowsheet for treatment today, total treatment time and time spent performing 1:1 timed  codes.

## 2018-08-18 NOTE — TELEPHONE ENCOUNTER
Patient states that she is wanting a refill on her antidepressant medication. Patient uses [facility] [phone]. Please advise. Thank you.   Stable

## 2020-03-10 ENCOUNTER — HEALTH MAINTENANCE LETTER (OUTPATIENT)
Age: 70
End: 2020-03-10

## 2020-12-20 ENCOUNTER — HEALTH MAINTENANCE LETTER (OUTPATIENT)
Age: 70
End: 2020-12-20

## 2021-04-24 ENCOUNTER — HEALTH MAINTENANCE LETTER (OUTPATIENT)
Age: 71
End: 2021-04-24

## 2021-10-03 ENCOUNTER — HEALTH MAINTENANCE LETTER (OUTPATIENT)
Age: 71
End: 2021-10-03

## 2022-03-20 ENCOUNTER — HEALTH MAINTENANCE LETTER (OUTPATIENT)
Age: 72
End: 2022-03-20

## 2022-05-15 ENCOUNTER — HEALTH MAINTENANCE LETTER (OUTPATIENT)
Age: 72
End: 2022-05-15

## 2022-09-11 ENCOUNTER — HEALTH MAINTENANCE LETTER (OUTPATIENT)
Age: 72
End: 2022-09-11

## 2023-10-01 ENCOUNTER — HEALTH MAINTENANCE LETTER (OUTPATIENT)
Age: 73
End: 2023-10-01